# Patient Record
Sex: MALE | Race: OTHER | HISPANIC OR LATINO
[De-identification: names, ages, dates, MRNs, and addresses within clinical notes are randomized per-mention and may not be internally consistent; named-entity substitution may affect disease eponyms.]

---

## 2021-09-23 PROBLEM — Z00.00 ENCOUNTER FOR PREVENTIVE HEALTH EXAMINATION: Status: ACTIVE | Noted: 2021-09-23

## 2021-09-24 ENCOUNTER — NON-APPOINTMENT (OUTPATIENT)
Age: 66
End: 2021-09-24

## 2021-09-24 ENCOUNTER — APPOINTMENT (OUTPATIENT)
Dept: CARDIOLOGY | Facility: CLINIC | Age: 66
End: 2021-09-24
Payer: COMMERCIAL

## 2021-09-24 VITALS
SYSTOLIC BLOOD PRESSURE: 134 MMHG | BODY MASS INDEX: 28.34 KG/M2 | OXYGEN SATURATION: 95 % | DIASTOLIC BLOOD PRESSURE: 76 MMHG | HEART RATE: 86 BPM | WEIGHT: 187 LBS | TEMPERATURE: 98.4 F | HEIGHT: 68 IN

## 2021-09-24 VITALS — DIASTOLIC BLOOD PRESSURE: 68 MMHG | SYSTOLIC BLOOD PRESSURE: 124 MMHG

## 2021-09-24 PROCEDURE — 93000 ELECTROCARDIOGRAM COMPLETE: CPT

## 2021-09-24 PROCEDURE — 99204 OFFICE O/P NEW MOD 45 MIN: CPT

## 2021-09-24 NOTE — HISTORY OF PRESENT ILLNESS
[FreeTextEntry1] : 66M h/o HLD, GERD, prostate cancer s/p radical prostatectomy (2/2021) with lymph node positive on androgen deprivation therapy (Apalutamide/Lupron) and salvage radiation follows with Dr. Mayelin Newton and Erasmo Koehler at [facility], recent onset of midsternal chest pain refer for cardiology evaluation. \par \par He reports 1 week ago at night awoke from sleep several times, pressure like pain, lasted for 10-15 minutes and occurred again 2nd time, feels different from his acid reflux, but had eaten some spicy food prior, then about 2 days later has similar chest discomfort at night but was milder. Denies exertional chest discomfort, can ambulate for 1 mile at time. \par \par \par No CAD or stroke in family\par Never smoker, social alcohol\par Working as , living in [place] \par \par Completed Pfizer vaccine

## 2021-09-24 NOTE — DISCUSSION/SUMMARY
[FreeTextEntry1] : 66M h/o HLD, GERD, prostate cancer s/p radical prostatectomy (2/2021) with lymph node positive on androgen deprivation therapy (Apalutamide/Lupron) and salvage radiation follows with Dr. Mayelin Newton and Erasmo Koehler at [facility], recent onset of midsternal chest pain refer for cardiology evaluation. \par \par Overall atypical nonexertional chest pain occurred during sleep, EKG within normal, we have discussed options of further cardiac workup with exercise treadmill stress vs. cardiac CTA, he prefers nonradioactive method first with exercise treadmill stress which is appropriate with his atypical symptoms, will check baseline echocardiogram as well. We have also discussed literature reports of increased cardiovascular risk with androgen deprivation therapy, long term goal is for for optimal risk factors control of metabolic syndrome with controlling lipid and blood glucose. \par \par \par \par 1. Atypical chest pain- await TTE and EST, consider cardiac CTA if stress test abnormal. \par \par 2. HLD- on low dose atorvastatin, check fasting lipid on next blood drawn with A1c. \par \par 3. GERD- on omeprazole. \par \par 4. Prostate cancer- on ADT and salvage radiation. \par \par \par Follow up as needed if the above tests are normal. \par \par \par CC: Dr. Newton and Dr. Koehler ([facility]).

## 2021-10-15 ENCOUNTER — APPOINTMENT (OUTPATIENT)
Dept: CARDIOLOGY | Facility: CLINIC | Age: 66
End: 2021-10-15
Payer: COMMERCIAL

## 2021-10-15 PROCEDURE — 93306 TTE W/DOPPLER COMPLETE: CPT

## 2021-10-15 PROCEDURE — 93015 CV STRESS TEST SUPVJ I&R: CPT

## 2021-10-15 PROCEDURE — 93356 MYOCRD STRAIN IMG SPCKL TRCK: CPT

## 2021-10-18 ENCOUNTER — NON-APPOINTMENT (OUTPATIENT)
Age: 66
End: 2021-10-18

## 2021-10-20 ENCOUNTER — RESULT REVIEW (OUTPATIENT)
Age: 66
End: 2021-10-20

## 2021-11-05 ENCOUNTER — OUTPATIENT (OUTPATIENT)
Dept: OUTPATIENT SERVICES | Facility: HOSPITAL | Age: 66
LOS: 1 days | End: 2021-11-05
Payer: COMMERCIAL

## 2021-11-05 DIAGNOSIS — R07.9 CHEST PAIN, UNSPECIFIED: ICD-10-CM

## 2021-11-05 PROCEDURE — 75571 CT HRT W/O DYE W/CA TEST: CPT

## 2021-11-05 PROCEDURE — 75571 CT HRT W/O DYE W/CA TEST: CPT | Mod: 26

## 2021-11-07 DIAGNOSIS — R94.39 ABNORMAL RESULT OF OTHER CARDIOVASCULAR FUNCTION STUDY: ICD-10-CM

## 2021-11-07 DIAGNOSIS — I25.84 ATHEROSCLEROTIC HEART DISEASE OF NATIVE CORONARY ARTERY W/OUT ANGINA PECTORIS: ICD-10-CM

## 2021-11-07 DIAGNOSIS — I25.10 ATHEROSCLEROTIC HEART DISEASE OF NATIVE CORONARY ARTERY W/OUT ANGINA PECTORIS: ICD-10-CM

## 2021-11-07 DIAGNOSIS — R07.9 CHEST PAIN, UNSPECIFIED: ICD-10-CM

## 2021-11-15 RX ORDER — METOPROLOL TARTRATE 100 MG/1
100 TABLET, FILM COATED ORAL DAILY
Qty: 2 | Refills: 0 | Status: DISCONTINUED | COMMUNITY
Start: 2021-10-18 | End: 2021-11-15

## 2021-11-18 ENCOUNTER — NON-APPOINTMENT (OUTPATIENT)
Age: 66
End: 2021-11-18

## 2021-11-18 ENCOUNTER — APPOINTMENT (OUTPATIENT)
Dept: CT IMAGING | Facility: CLINIC | Age: 66
End: 2021-11-18

## 2021-11-25 DIAGNOSIS — Z01.818 ENCOUNTER FOR OTHER PREPROCEDURAL EXAMINATION: ICD-10-CM

## 2021-11-26 ENCOUNTER — APPOINTMENT (OUTPATIENT)
Dept: DISASTER EMERGENCY | Facility: CLINIC | Age: 66
End: 2021-11-26

## 2021-11-26 NOTE — H&P PST ADULT - PRIMARY CARE PROVIDER
Santy Barajas ([address], [place] [address], Phone: [phone], Fax: [phone])

## 2021-11-26 NOTE — H&P PST ADULT - NSICDXPASTSURGICALHX_GEN_ALL_CORE_FT
PAST SURGICAL HISTORY:  History of appendectomy     History of knee surgery     History of left inguinal hernia repair     History of radical prostatectomy

## 2021-11-26 NOTE — H&P PST ADULT - NSICDXFAMILYHX_GEN_ALL_CORE_FT
FAMILY HISTORY:  Father  Still living? Yes, Estimated age: 81-90  Family history of hypertension, Age at diagnosis: Age Unknown  Family history of malignant neoplasm of prostate, Age at diagnosis: Age Unknown    Mother  Still living? Yes, Estimated age: 81-90  Family history of dementia, Age at diagnosis: Age Unknown

## 2021-11-26 NOTE — H&P PST ADULT - LAB RESULTS AND INTERPRETATION
Lipids       Total Cholesterol: 153       Triglycerides: 111       HDL: 58       Non-HDL: 95       LDL: 73    HgbA1C: 4.7%

## 2021-11-26 NOTE — H&P PST ADULT - NSICDXPASTMEDICALHX_GEN_ALL_CORE_FT
PAST MEDICAL HISTORY:  GERD (gastroesophageal reflux disease)     Primary hypertension     Prostate cancer     Pure hypercholesterolemia

## 2021-11-26 NOTE — H&P PST ADULT - OTHER CARE PROVIDERS
Jack Younger ([facility] [place], [address], [address], [place] [address], Phone: [phone], Fax: [phone]) Jack Younger ([facility] [place], [address], [address], [place] [address], Phone: [phone], Fax: [phone]), Erasmo Koehler ([facility], [address] [place] [address], Phone: [phone])

## 2021-11-26 NOTE — H&P PST ADULT - ASSESSMENT
Assessment:     ASA:   Mall:   ABR:   COVID:     Problem List:   1.   ·	LHC and possible intervention. Consent obtained.  ·	Will start DAPT if PCI performed.  ·	IV: NS KVO  ·	Aspirin if not taken today.    2.     Discharge Planning:   ·	Discharge today if no PCI performed.  ·	Discharge in AM if PCI performed. Assessment: 67y/o male never smoker with history of HTN, HLD, GERD, and lymph node positive prostate cancer on apalutamide and Lupron, S/P radical prostatectomy, c/o a few of episodes of awakening with midsternal chest pressure. He had an exercise stress test during which he exercised for 9:00 and had equivocal EKG changes. He had a cardiac calcium score which showed calcium scores of LM: 216, LAD: 857, LCX: 173, RCA: 122, Total: 1368. He admits to mild MAZARIEGOS (CCS class 2 anginal equivalent), but denies palpitations, orthopnea, PND or syncope/near syncope. He denies any recent hematuria, black or bloody stools, or bleeding/bruising issues.    ASA: 3  Mall: 2  ABR:   COVID: Negative 11/26/2021    Problem List:   1. Unstable angina with high risk cardiac calcium score. The patient is on apalutamide (known adverse reaction of cardiac ischemia)  ·	LHC and possible intervention. Consent obtained.  ·	Will start DAPT if PCI performed.  ·	IV: NS KVO  ·	Aspirin if not taken today.    2.     Discharge Planning:   ·	Discharge today if no PCI performed.  ·	Discharge in AM if PCI performed. Assessment: 67y/o male never smoker with history of HTN, HLD, GERD, and lymph node positive prostate cancer on apalutamide and Lupron, S/P radical prostatectomy, c/o a few of episodes of awakening with midsternal chest pressure. He had an exercise stress test during which he exercised for 9:00 and had equivocal EKG changes. He had a cardiac calcium score which showed calcium scores of LM: 216, LAD: 857, LCX: 173, RCA: 122, Total: 1368. He admits to mild MAZARIEGOS (CCS class 2 anginal equivalent), but denies palpitations, orthopnea, PND or syncope/near syncope. He denies any recent hematuria, black or bloody stools, or bleeding/bruising issues.    ASA: 3  Mall: 2  ABR: 1.0%  COVID: Negative 11/26/2021    Problem List:   1. Unstable angina with high risk cardiac calcium score. The patient is on apalutamide (known adverse reaction of cardiac ischemia)  ·	LHC and possible intervention. Consent obtained.  ·	Will start DAPT if PCI performed.  ·	IV: NS KVO  ·	Aspirin if not taken today.    Discharge Planning:   ·	Discharge today if no PCI performed.  ·	Discharge in AM if PCI performed.

## 2021-11-26 NOTE — H&P PST ADULT - HISTORY OF PRESENT ILLNESS
65y/o male never smoker with history of HTN, GERD, and lymph node positive prostate cancer on apalutamide and Lupron, c/o a few of episodes of awakening with midsternal chest pressure. He had an exercise stress test during which he exercised for 9:00 and had equivocal EKG changes. He had a cardiac calcium score which showed calcium scores of LM: 216, LAD: 857, LCX: 173, RCA: 122, Total: 1368.     Symptoms:        Angina (Class): 4       Ischemic Symptoms:     Heart Failure: N/A    Assessment of LVEF:       EF: 60-65%       Assessed by: Echo       Date: 10/15/2021    Prior Cardiac Interventions:       PCI's: N/A       CABG: N/A    Noninvasive Testing:   Stress Test: 10/15/2021       Protocol: Brett       Duration of Exercise: 9:00       Symptoms: Fatigue       EKG Changes: 1 mm upsloping ST depressions in V4-5.       DTS: 9    Cardiac CT Calcium Score: 5/20/2021       LM: 216       LAD: 857       LCX: 173       RCA: 122       Total: 1368    Echo: 10/15/2021       LV: Normal, EF 60-65%       RV: Normal       LA: Normal       RA: Normal       Mitral Valve: Trace MR       Aortic Valve: Normal       Tricuspid Valve: Trivial TR       Pulmonic Valve: Normal       Pericardium: No evidence of pericardial effusion    Antianginal Therapies:        Beta Blockers: N/A       Calcium Channel Blockers: N/A       Long Acting Nitrates: N/A       Ranexa: N/A    Associated Risk Factors:        Cerebrovascular Disease: N/A       Chronic Lung Disease: N/A       Peripheral Arterial Disease: N/A       Chronic Kidney Disease (if yes, what is GFR): N/A       Uncontrolled Diabetes (if yes, what is HgbA1C or FBS): N/A       Poorly Controlled Hypertension (if yes, what is SBP): N/A       Morbid Obesity (if yes, what is BMI): N/A       History of Recent Ventricular Arrhythmia: N/A       Inability to Ambulate Safely: N/A       Need for Therapeutic Anticoagulation: N/A       Antiplatelet or Contrast Allergy: N/A    Social History:        Marital:        Tobacco:        ETOH:        Caffeine:     ROS:   General: No fevers/chills, no fatigue  HEENT: No visual disturbances, no hearing loss, no headaches, no epistaxis.  CV: No chest pain, no MAZARIEGOS, no palpitations, no edema, no orthopnea, no PND.  Respiratory: No dyspnea, no wheeze, no cough.  GI: no nausea/vomiting, no black/bloody stools.  : No hematuria  Musculoskeletal: No myalgias, no arthralgias, no back pain.  Neurologic: No weakness, no hemiparesis, no paresthesias, no seizures, no syncope/near syncope.    Vital Signs Last 24 Hrs  T(C): --  T(F): --  HR: --  BP: --  BP(mean): --  RR: --  SpO2: --    Physical Examination:   General: Awake, alert, speech clear, no acute distress.  HEENT: PERRL, EOMI.  Neck: No elevated JVP, no bruit, trachea midline.  Chest: CTA B/L, S1, S2, no murmur, RRR.  Abdomen: Soft, nontender, nondistended, normal bowel sounds.  Extremities: No edema, 2+ pulses X 4 extremities.  Neurologic: A&OX3, CN 2-12 grossly intact. 65y/o male never smoker with history of HTN, HLD, GERD, and lymph node positive prostate cancer on apalutamide and Lupron, S/P radical prostatectomy, c/o a few of episodes of awakening with midsternal chest pressure. He had an exercise stress test during which he exercised for 9:00 and had equivocal EKG changes. He had a cardiac calcium score which showed calcium scores of LM: 216, LAD: 857, LCX: 173, RCA: 122, Total: 1368. He admits to mild MAZARIEGOS (CCS class 2 anginal equivalent), but denies palpitations, orthopnea, PND or syncope/near syncope. He denies any recent hematuria, black or bloody stools, or bleeding/bruising issues.    Symptoms:        Angina (Class): 4       Ischemic Symptoms: MAZARIEGOS (CCS class 2 anginal equivalent)    Heart Failure: N/A    Assessment of LVEF:       EF: 60-65%       Assessed by: Echo       Date: 10/15/2021    Prior Cardiac Interventions:       PCI's: N/A       CABG: N/A    Noninvasive Testing:   Stress Test: 10/15/2021       Protocol: Brett       Duration of Exercise: 9:00       Symptoms: Fatigue       EKG Changes: 1 mm upsloping ST depressions in V4-5.       DTS: 9    Cardiac CT Calcium Score: 5/20/2021       LM: 216       LAD: 857       LCX: 173       RCA: 122       Total: 1368    Echo: 10/15/2021       LV: Normal, EF 60-65%       RV: Normal       LA: Normal       RA: Normal       Mitral Valve: Trace MR       Aortic Valve: Normal       Tricuspid Valve: Trivial TR       Pulmonic Valve: Normal       Pericardium: No evidence of pericardial effusion    Antianginal Therapies:        Beta Blockers: N/A       Calcium Channel Blockers: N/A       Long Acting Nitrates: N/A       Ranexa: N/A    Associated Risk Factors:        Cerebrovascular Disease: N/A       Chronic Lung Disease: N/A       Peripheral Arterial Disease: N/A       Chronic Kidney Disease (if yes, what is GFR): N/A       Uncontrolled Diabetes (if yes, what is HgbA1C or FBS): N/A       Poorly Controlled Hypertension (if yes, what is SBP): N/A       Morbid Obesity (if yes, what is BMI): N/A       History of Recent Ventricular Arrhythmia: N/A       Inability to Ambulate Safely: N/A       Need for Therapeutic Anticoagulation: N/A       Antiplatelet or Contrast Allergy: N/A    Social History:        Marital: , lives alone, permanent residence is [place] but staying in [place] for radiation therapy.       Tobacco: Never smoker       ETOH: Social beer drinker       Caffeine: 1-2 cups of coffee/day    ROS:   General: No fevers/chills, no fatigue, + hot flashes.  HEENT: No visual disturbances, no hearing loss, no headaches, no epistaxis.  CV: + nonexertional chest pain, + MAZARIEGOS, no palpitations, no edema, no orthopnea, no PND.  Respiratory: No dyspnea, no wheeze, no cough.  GI: no nausea/vomiting, no black/bloody stools.  : No hematuria  Musculoskeletal: No myalgias, right shoulder pain, no back pain.  Neurologic: No weakness, no hemiparesis, no paresthesias, no seizures, no syncope/near syncope.    T(C): --  HR: --  BP: --  RR: --  SpO2: --    Physical Examination:   General: Awake, alert, speech clear, no acute distress.  HEENT: PERRL, EOMI.  Neck: No elevated JVP, no bruit, trachea midline.  Chest: CTA B/L, S1, S2, no murmur, RRR.  Abdomen: Soft, nontender, nondistended, normal bowel sounds.  Extremities: No edema, 2+ pulses X 4 extremities.  Neurologic: A&OX3, CN 2-12 grossly intact. 67y/o male never smoker with history of HTN, HLD, GERD, and lymph node positive prostate cancer on apalutamide and Lupron, S/P radical prostatectomy, c/o a few of episodes of awakening with midsternal chest pressure. He had an exercise stress test during which he exercised for 9:00 and had equivocal EKG changes. He had a cardiac calcium score which showed calcium scores of LM: 216, LAD: 857, LCX: 173, RCA: 122, Total: 1368. He admits to mild MAZARIEGOS (CCS class 2 anginal equivalent), but denies palpitations, orthopnea, PND or syncope/near syncope. He denies any recent hematuria, black or bloody stools, or bleeding/bruising issues.    Symptoms:        Angina (Class): 4       Ischemic Symptoms: MAZARIEGOS (CCS class 2 anginal equivalent)    Heart Failure: N/A    Assessment of LVEF:       EF: 60-65%       Assessed by: Echo       Date: 10/15/2021    Prior Cardiac Interventions:       PCI's: N/A       CABG: N/A    Noninvasive Testing:   Stress Test: 10/15/2021       Protocol: Brett       Duration of Exercise: 9:00       Symptoms: Fatigue       EKG Changes: 1 mm upsloping ST depressions in V4-5.       DTS: 9    Cardiac CT Calcium Score: 5/20/2021       LM: 216       LAD: 857       LCX: 173       RCA: 122       Total: 1368    Echo: 10/15/2021       LV: Normal, EF 60-65%       RV: Normal       LA: Normal       RA: Normal       Mitral Valve: Trace MR       Aortic Valve: Normal       Tricuspid Valve: Trivial TR       Pulmonic Valve: Normal       Pericardium: No evidence of pericardial effusion    Antianginal Therapies:        Beta Blockers: N/A       Calcium Channel Blockers: N/A       Long Acting Nitrates: N/A       Ranexa: N/A    Associated Risk Factors:        Cerebrovascular Disease: N/A       Chronic Lung Disease: N/A       Peripheral Arterial Disease: N/A       Chronic Kidney Disease (if yes, what is GFR): N/A       Uncontrolled Diabetes (if yes, what is HgbA1C or FBS): N/A       Poorly Controlled Hypertension (if yes, what is SBP): N/A       Morbid Obesity (if yes, what is BMI): N/A       History of Recent Ventricular Arrhythmia: N/A       Inability to Ambulate Safely: N/A       Need for Therapeutic Anticoagulation: N/A       Antiplatelet or Contrast Allergy: N/A    Social History:        Marital: , lives alone, permanent residence is [place] but staying in [place] for radiation therapy.       Tobacco: Never smoker       ETOH: Social beer drinker       Caffeine: 1-2 cups of coffee/day    ROS:   General: No fevers/chills, no fatigue, + hot flashes.  HEENT: No visual disturbances, no hearing loss, no headaches, no epistaxis.  CV: + nonexertional chest pain, + MAZARIEGOS, no palpitations, no edema, no orthopnea, no PND.  Respiratory: No dyspnea, no wheeze, no cough.  GI: no nausea/vomiting, no black/bloody stools.  : No hematuria  Musculoskeletal: No myalgias, right shoulder pain, no back pain.  Neurologic: No weakness, no hemiparesis, no paresthesias, no seizures, no syncope/near syncope.    T(C): 36.7 (11-29-21 @ 08:21), Max: 36.7 (11-29-21 @ 08:21)  HR: 80 (11-29-21 @ 08:21)  BP: 145/65 (11-29-21 @ 08:21)  RR: 20 (11-29-21 @ 08:21)  SpO2: 98% (11-29-21 @ 08:21)    Physical Examination:   General: Awake, alert, speech clear, no acute distress.  HEENT: PERRL, EOMI.  Neck: No elevated JVP, no bruit, trachea midline.  Chest: CTA B/L, S1, S2, no murmur, RRR.  Abdomen: Soft, nontender, nondistended, normal bowel sounds.  Extremities: No edema, 2+ pulses X 4 extremities.  Neurologic: A&OX3, CN 2-12 grossly intact.

## 2021-11-27 LAB — SARS-COV-2 N GENE NPH QL NAA+PROBE: NOT DETECTED

## 2021-11-29 ENCOUNTER — INPATIENT (INPATIENT)
Facility: HOSPITAL | Age: 66
LOS: 1 days | Discharge: HOME | DRG: 247 | End: 2021-12-01
Attending: INTERNAL MEDICINE | Admitting: INTERNAL MEDICINE
Payer: COMMERCIAL

## 2021-11-29 VITALS
TEMPERATURE: 98 F | SYSTOLIC BLOOD PRESSURE: 145 MMHG | HEART RATE: 80 BPM | OXYGEN SATURATION: 98 % | RESPIRATION RATE: 20 BRPM | DIASTOLIC BLOOD PRESSURE: 65 MMHG | WEIGHT: 180.34 LBS | HEIGHT: 68 IN

## 2021-11-29 DIAGNOSIS — Z98.890 OTHER SPECIFIED POSTPROCEDURAL STATES: Chronic | ICD-10-CM

## 2021-11-29 DIAGNOSIS — Z90.49 ACQUIRED ABSENCE OF OTHER SPECIFIED PARTS OF DIGESTIVE TRACT: Chronic | ICD-10-CM

## 2021-11-29 DIAGNOSIS — Z90.79 ACQUIRED ABSENCE OF OTHER GENITAL ORGAN(S): Chronic | ICD-10-CM

## 2021-11-29 DIAGNOSIS — R07.9 CHEST PAIN, UNSPECIFIED: ICD-10-CM

## 2021-11-29 LAB
A1C WITH ESTIMATED AVERAGE GLUCOSE RESULT: 4.7 % — SIGNIFICANT CHANGE UP (ref 4–5.6)
ABO RH CONFIRMATION: SIGNIFICANT CHANGE UP
ALBUMIN SERPL ELPH-MCNC: 4.4 G/DL — SIGNIFICANT CHANGE UP (ref 3.3–5.2)
ALP SERPL-CCNC: 95 U/L — SIGNIFICANT CHANGE UP (ref 40–120)
ALT FLD-CCNC: 24 U/L — SIGNIFICANT CHANGE UP
ANION GAP SERPL CALC-SCNC: 13 MMOL/L — SIGNIFICANT CHANGE UP (ref 5–17)
AST SERPL-CCNC: 26 U/L — SIGNIFICANT CHANGE UP
BASOPHILS # BLD AUTO: 0.03 K/UL — SIGNIFICANT CHANGE UP (ref 0–0.2)
BASOPHILS NFR BLD AUTO: 0.8 % — SIGNIFICANT CHANGE UP (ref 0–2)
BILIRUB SERPL-MCNC: 0.3 MG/DL — LOW (ref 0.4–2)
BLD GP AB SCN SERPL QL: SIGNIFICANT CHANGE UP
BUN SERPL-MCNC: 25.5 MG/DL — HIGH (ref 8–20)
CALCIUM SERPL-MCNC: 9 MG/DL — SIGNIFICANT CHANGE UP (ref 8.6–10.2)
CHLORIDE SERPL-SCNC: 102 MMOL/L — SIGNIFICANT CHANGE UP (ref 98–107)
CHOLEST SERPL-MCNC: 153 MG/DL — SIGNIFICANT CHANGE UP
CO2 SERPL-SCNC: 22 MMOL/L — SIGNIFICANT CHANGE UP (ref 22–29)
CREAT SERPL-MCNC: 0.99 MG/DL — SIGNIFICANT CHANGE UP (ref 0.5–1.3)
EOSINOPHIL # BLD AUTO: 0.15 K/UL — SIGNIFICANT CHANGE UP (ref 0–0.5)
EOSINOPHIL NFR BLD AUTO: 3.9 % — SIGNIFICANT CHANGE UP (ref 0–6)
ESTIMATED AVERAGE GLUCOSE: 88 MG/DL — SIGNIFICANT CHANGE UP (ref 68–114)
GLUCOSE SERPL-MCNC: 97 MG/DL — SIGNIFICANT CHANGE UP (ref 70–99)
HCT VFR BLD CALC: 36.7 % — LOW (ref 39–50)
HDLC SERPL-MCNC: 58 MG/DL — SIGNIFICANT CHANGE UP
HGB BLD-MCNC: 12.5 G/DL — LOW (ref 13–17)
IMM GRANULOCYTES NFR BLD AUTO: 0.8 % — SIGNIFICANT CHANGE UP (ref 0–1.5)
LIPID PNL WITH DIRECT LDL SERPL: 73 MG/DL — SIGNIFICANT CHANGE UP
LYMPHOCYTES # BLD AUTO: 0.55 K/UL — LOW (ref 1–3.3)
LYMPHOCYTES # BLD AUTO: 14.2 % — SIGNIFICANT CHANGE UP (ref 13–44)
MAGNESIUM SERPL-MCNC: 2 MG/DL — SIGNIFICANT CHANGE UP (ref 1.6–2.6)
MCHC RBC-ENTMCNC: 32.2 PG — SIGNIFICANT CHANGE UP (ref 27–34)
MCHC RBC-ENTMCNC: 34.1 GM/DL — SIGNIFICANT CHANGE UP (ref 32–36)
MCV RBC AUTO: 94.6 FL — SIGNIFICANT CHANGE UP (ref 80–100)
MONOCYTES # BLD AUTO: 0.47 K/UL — SIGNIFICANT CHANGE UP (ref 0–0.9)
MONOCYTES NFR BLD AUTO: 12.1 % — SIGNIFICANT CHANGE UP (ref 2–14)
NEUTROPHILS # BLD AUTO: 2.64 K/UL — SIGNIFICANT CHANGE UP (ref 1.8–7.4)
NEUTROPHILS NFR BLD AUTO: 68.2 % — SIGNIFICANT CHANGE UP (ref 43–77)
NON HDL CHOLESTEROL: 95 MG/DL — SIGNIFICANT CHANGE UP
PLATELET # BLD AUTO: 222 K/UL — SIGNIFICANT CHANGE UP (ref 150–400)
POTASSIUM SERPL-MCNC: 3.8 MMOL/L — SIGNIFICANT CHANGE UP (ref 3.5–5.3)
POTASSIUM SERPL-SCNC: 3.8 MMOL/L — SIGNIFICANT CHANGE UP (ref 3.5–5.3)
PROT SERPL-MCNC: 7 G/DL — SIGNIFICANT CHANGE UP (ref 6.6–8.7)
RBC # BLD: 3.88 M/UL — LOW (ref 4.2–5.8)
RBC # FLD: 13.7 % — SIGNIFICANT CHANGE UP (ref 10.3–14.5)
SODIUM SERPL-SCNC: 137 MMOL/L — SIGNIFICANT CHANGE UP (ref 135–145)
TRIGL SERPL-MCNC: 111 MG/DL — SIGNIFICANT CHANGE UP
WBC # BLD: 3.87 K/UL — SIGNIFICANT CHANGE UP (ref 3.8–10.5)
WBC # FLD AUTO: 3.87 K/UL — SIGNIFICANT CHANGE UP (ref 3.8–10.5)

## 2021-11-29 PROCEDURE — 99223 1ST HOSP IP/OBS HIGH 75: CPT | Mod: 25

## 2021-11-29 PROCEDURE — 93010 ELECTROCARDIOGRAM REPORT: CPT | Mod: 76

## 2021-11-29 RX ORDER — METOPROLOL TARTRATE 50 MG
12.5 TABLET ORAL
Refills: 0 | Status: DISCONTINUED | OUTPATIENT
Start: 2021-11-29 | End: 2021-12-01

## 2021-11-29 RX ORDER — POTASSIUM CHLORIDE 20 MEQ
20 PACKET (EA) ORAL ONCE
Refills: 0 | Status: COMPLETED | OUTPATIENT
Start: 2021-11-29 | End: 2021-11-29

## 2021-11-29 RX ORDER — PANTOPRAZOLE SODIUM 20 MG/1
40 TABLET, DELAYED RELEASE ORAL
Refills: 0 | Status: DISCONTINUED | OUTPATIENT
Start: 2021-11-29 | End: 2021-12-01

## 2021-11-29 RX ORDER — VALACYCLOVIR 500 MG/1
1 TABLET, FILM COATED ORAL
Qty: 0 | Refills: 0 | DISCHARGE

## 2021-11-29 RX ORDER — CLOPIDOGREL BISULFATE 75 MG/1
75 TABLET, FILM COATED ORAL DAILY
Refills: 0 | Status: DISCONTINUED | OUTPATIENT
Start: 2021-11-30 | End: 2021-12-01

## 2021-11-29 RX ORDER — TADALAFIL 10 MG/1
1 TABLET, FILM COATED ORAL
Qty: 0 | Refills: 0 | DISCHARGE

## 2021-11-29 RX ORDER — APALUTAMIDE 240 MG/1
4 TABLET, FILM COATED ORAL
Qty: 0 | Refills: 0 | DISCHARGE

## 2021-11-29 RX ORDER — ASPIRIN/CALCIUM CARB/MAGNESIUM 324 MG
0 TABLET ORAL
Qty: 0 | Refills: 0 | DISCHARGE

## 2021-11-29 RX ORDER — CLOPIDOGREL BISULFATE 75 MG/1
600 TABLET, FILM COATED ORAL ONCE
Refills: 0 | Status: COMPLETED | OUTPATIENT
Start: 2021-11-29 | End: 2021-11-29

## 2021-11-29 RX ORDER — ASPIRIN/CALCIUM CARB/MAGNESIUM 324 MG
81 TABLET ORAL DAILY
Refills: 0 | Status: DISCONTINUED | OUTPATIENT
Start: 2021-11-29 | End: 2021-12-01

## 2021-11-29 RX ORDER — PANTOPRAZOLE SODIUM 20 MG/1
1 TABLET, DELAYED RELEASE ORAL
Qty: 0 | Refills: 0 | DISCHARGE

## 2021-11-29 RX ORDER — APALUTAMIDE 240 MG/1
240 TABLET, FILM COATED ORAL
Refills: 0 | Status: DISCONTINUED | OUTPATIENT
Start: 2021-11-29 | End: 2021-12-01

## 2021-11-29 RX ORDER — ATORVASTATIN CALCIUM 80 MG/1
80 TABLET, FILM COATED ORAL AT BEDTIME
Refills: 0 | Status: DISCONTINUED | OUTPATIENT
Start: 2021-11-29 | End: 2021-12-01

## 2021-11-29 RX ADMIN — CLOPIDOGREL BISULFATE 600 MILLIGRAM(S): 75 TABLET, FILM COATED ORAL at 18:57

## 2021-11-29 RX ADMIN — Medication 12.5 MILLIGRAM(S): at 17:01

## 2021-11-29 RX ADMIN — ATORVASTATIN CALCIUM 80 MILLIGRAM(S): 80 TABLET, FILM COATED ORAL at 21:39

## 2021-11-29 RX ADMIN — Medication 20 MILLIEQUIVALENT(S): at 09:13

## 2021-11-29 NOTE — PROGRESS NOTE ADULT - SUBJECTIVE AND OBJECTIVE BOX
Department of Cardiology                                                                  [facility]/[place] [address] [place] [address]                                                            Telephone: [phone]. Fax:[phone]                                                                                         PCI NOTE       Subjective:  66y  Male who had a left heart catheterization which showed (official report pending):       LM: 20% stenosis       LAD: 70% proximal and mid stenosis, heavily calcified.       LCX: 50% mid stenosis       RCA: calcified 80% mid stenosis treated with a 4.0 X 38 Synergy JANETH post dilated to 4.5 mm.         Access: Right radial artery       Hemostasis: Radial band       Total Contrast: 147 mL Omnipaque    PAST MEDICAL & SURGICAL HISTORY:  GERD (gastroesophageal reflux disease)  Primary hypertension  Prostate cancer  Pure hypercholesterolemia  History of radical prostatectomy  History of left inguinal hernia repair  History of appendectomy  History of knee surgery    FAMILY HISTORY:  Family history of hypertension (Father)  Family history of malignant neoplasm of prostate (Father)  Family history of dementia (Mother)    Home Medications:  apalutamide 60 mg oral tablet: 4 tab(s) orally once a day (29 Nov 2021 08:37)  aspirin 81 mg oral tablet:  (29 Nov 2021 08:37)  atorvastatin 40 mg oral tablet: 1 tab(s) orally once a day (29 Nov 2021 08:37)  pantoprazole 40 mg oral delayed release tablet: 1 tab(s) orally once a day (29 Nov 2021 08:37)  tadalafil 5 mg oral tablet: 1 tab(s) orally once a day, once a week take 4 tab (29 Nov 2021 08:37)  valACYclovir 1 g oral tablet: 1 tab(s) orally 2 times a day for 7days as needed for sores (29 Nov 2021 08:37)    HPI: 67y/o male never smoker with history of HTN, HLD, GERD, and lymph node positive prostate cancer on apalutamide and Lupron, S/P radical prostatectomy, c/o a few of episodes of awakening with midsternal chest pressure. He had an exercise stress test during which he exercised for 9:00 and had equivocal EKG changes. He had a cardiac calcium score which showed calcium scores of LM: 216, LAD: 857, LCX: 173, RCA: 122, Total: 1368. He admits to mild MAZARIEGOS (CCS class 2 anginal equivalent), but denies palpitations, orthopnea, PND or syncope/near syncope. He denies any recent hematuria, black or bloody stools, or bleeding/bruising issues.    General: No fatigue, no fevers/chills  Respiratory: No dyspnea, no cough, no wheeze  CV: No chest pain, no palpitations  Abd: No nausea  Neuro: No headache, no dizziness    No Known Allergies    Objective:  Vital Signs Last 24 Hrs  T(C): 36.7 (29 Nov 2021 08:21), Max: 36.7 (29 Nov 2021 08:21)  T(F): 98 (29 Nov 2021 08:21), Max: 98 (29 Nov 2021 08:21)  HR: 72 (29 Nov 2021 10:45) (72 - 80)  BP: 166/93 (29 Nov 2021 10:45) (145/65 - 166/93)  RR: 16 (29 Nov 2021 10:45) (16 - 20)  SpO2: 99% (29 Nov 2021 10:45) (98% - 99%)    CM: SR  Neuro: A&OX3, CN 2-12 intact  HEENT: NC, AT  Lungs: CTA B/L  CV: S1, S2, no murmur, RRR  Abd: Soft  Extremity: Right radial band: no bleeding, fingers warm with good cap refil    EKG: NSR, normal axis, no significant ST/T wave abnormality                        12.5   3.87  )-----------( 222      ( 29 Nov 2021 08:18 )             36.7     11-29    137  |  102  |  25.5  ----------------------------<  97  3.8   |  22.0  |  0.99    Ca    9.0      29 Nov 2021 08:18  Mg     2.0     11-29    TPro  7.0  /  Alb  4.4  /  TBili  0.3  /  DBili  x   /  AST  26  /  ALT  24  /  AlkPhos  95  11-29    Lipids       Total Cholesterol: 153       Triglycerides: 111       HDL: 58       Non-HDL: 95       LDL: 73    HgbA1C: 4.7%                                                                          Department of Cardiology                                                                  [facility]/[place] [address]                                                            Telephone: [phone]. Fax:[phone]                                                                                         PCI NOTE       Subjective:  66y  Male who had a left heart catheterization which showed:  VENTRICLES: No LV gram was performed; however, a recent echocardiogram demonstrated an EF of 55 %.  CORONARY VESSELS: The coronary circulation is right dominant.  LM:     --  LM: Normal.  LAD:     --  Proximal LAD: There was a 70 % stenosis. The lesion was heavily calcified.  --  Mid LAD: There was a diffuse 80 % stenosis. The lesion was heavily calcified.  CX:     --  Mid circumflex: There was a 50 % stenosis.  RCA:     --  Proximal RCA: There was a 40 % stenosis.  --  Mid RCA: There was a 80 % stenosis. It was treated with a 4.0 X 38 Synergy JANETH post dilated to 4.5 mm.         Access: Right radial artery       Hemostasis: Radial band       Total Contrast: 147 mL Omnipaque    PAST MEDICAL & SURGICAL HISTORY:  GERD (gastroesophageal reflux disease)  Primary hypertension  Prostate cancer  Pure hypercholesterolemia  History of radical prostatectomy  History of left inguinal hernia repair  History of appendectomy  History of knee surgery    FAMILY HISTORY:  Family history of hypertension (Father)  Family history of malignant neoplasm of prostate (Father)  Family history of dementia (Mother)    Home Medications:  apalutamide 60 mg oral tablet: 4 tab(s) orally once a day (29 Nov 2021 08:37)  aspirin 81 mg oral tablet:  (29 Nov 2021 08:37)  atorvastatin 40 mg oral tablet: 1 tab(s) orally once a day (29 Nov 2021 08:37)  pantoprazole 40 mg oral delayed release tablet: 1 tab(s) orally once a day (29 Nov 2021 08:37)  tadalafil 5 mg oral tablet: 1 tab(s) orally once a day, once a week take 4 tab (29 Nov 2021 08:37)  valACYclovir 1 g oral tablet: 1 tab(s) orally 2 times a day for 7days as needed for sores (29 Nov 2021 08:37)    HPI: 67y/o male never smoker with history of HTN, HLD, GERD, and lymph node positive prostate cancer on apalutamide and Lupron, S/P radical prostatectomy, c/o a few of episodes of awakening with midsternal chest pressure. He had an exercise stress test during which he exercised for 9:00 and had equivocal EKG changes. He had a cardiac calcium score which showed calcium scores of LM: 216, LAD: 857, LCX: 173, RCA: 122, Total: 1368. He admits to mild MAZARIEGOS (CCS class 2 anginal equivalent), but denies palpitations, orthopnea, PND or syncope/near syncope. He denies any recent hematuria, black or bloody stools, or bleeding/bruising issues.    General: No fatigue, no fevers/chills  Respiratory: No dyspnea, no cough, no wheeze  CV: No chest pain, no palpitations  Abd: No nausea  Neuro: No headache, no dizziness    No Known Allergies    Objective:  Vital Signs Last 24 Hrs  T(C): 36.7 (29 Nov 2021 08:21), Max: 36.7 (29 Nov 2021 08:21)  T(F): 98 (29 Nov 2021 08:21), Max: 98 (29 Nov 2021 08:21)  HR: 72 (29 Nov 2021 10:45) (72 - 80)  BP: 166/93 (29 Nov 2021 10:45) (145/65 - 166/93)  RR: 16 (29 Nov 2021 10:45) (16 - 20)  SpO2: 99% (29 Nov 2021 10:45) (98% - 99%)    CM: SR  Neuro: A&OX3, CN 2-12 intact  HEENT: NC, AT  Lungs: CTA B/L  CV: S1, S2, no murmur, RRR  Abd: Soft  Extremity: Right radial band: no bleeding, fingers warm with good cap refil    EKG: NSR, normal axis, no significant ST/T wave abnormality                        12.5   3.87  )-----------( 222      ( 29 Nov 2021 08:18 )             36.7     11-29    137  |  102  |  25.5  ----------------------------<  97  3.8   |  22.0  |  0.99    Ca    9.0      29 Nov 2021 08:18  Mg     2.0     11-29    TPro  7.0  /  Alb  4.4  /  TBili  0.3  /  DBili  x   /  AST  26  /  ALT  24  /  AlkPhos  95  11-29    Lipids       Total Cholesterol: 153       Triglycerides: 111       HDL: 58       Non-HDL: 95       LDL: 73    HgbA1C: 4.7%

## 2021-11-29 NOTE — PROGRESS NOTE ADULT - ASSESSMENT
66y Male    Assessment and Plan:     1. CAD, S/P PCI of the RCA  ·	Remove radial band at: 12:30PM.  ·	Will return to [facility] for PCI of the LAD with rotational atherectomy on Wednesday.  ·	DAPT: The patient was loaded with Brilinta 180 mg in the CCL. He will be loaded with Plavix 600 mg tonight at 6:00PM, then start Plavix 75mg daily and aspirin 81mg daily tomorrow.  ·	Statin: Lipitor 80 mg daily.  ·	Aggressive lifestyle modification and risk factor reduction.  ·	Cardiac rehab info provided/referral and communication to cardiac rehab completed   ·	CBC, BMP, Mg, EKG in AM    2. HLD  ·	LDL Goal: NonHDL < 100, LDL < 70  ·	LDL slightly above goal.  ·	Statin: Lipitor increased to 80 mg daily.    3. HTN  ·	Will start metoprolol 12.5 mg BID.    4. Prostate cancer  ·	Apalutamide 240 mg daily  ·	Lupron to be continued as an outpatient.    Discharge Planning:   ·	Will return to [facility] for PCI of the LAD on Wednesday.  ·	Discharge on Thursday.  ·	Follow up as an outpatient with: Aren

## 2021-11-30 ENCOUNTER — TRANSCRIPTION ENCOUNTER (OUTPATIENT)
Age: 66
End: 2021-11-30

## 2021-11-30 LAB
ANION GAP SERPL CALC-SCNC: 12 MMOL/L — SIGNIFICANT CHANGE UP (ref 5–17)
BUN SERPL-MCNC: 16 MG/DL — SIGNIFICANT CHANGE UP (ref 8–20)
CALCIUM SERPL-MCNC: 9 MG/DL — SIGNIFICANT CHANGE UP (ref 8.6–10.2)
CHLORIDE SERPL-SCNC: 105 MMOL/L — SIGNIFICANT CHANGE UP (ref 98–107)
CO2 SERPL-SCNC: 23 MMOL/L — SIGNIFICANT CHANGE UP (ref 22–29)
CREAT SERPL-MCNC: 0.94 MG/DL — SIGNIFICANT CHANGE UP (ref 0.5–1.3)
GLUCOSE SERPL-MCNC: 106 MG/DL — HIGH (ref 70–99)
HCT VFR BLD CALC: 35.6 % — LOW (ref 39–50)
HGB BLD-MCNC: 12.1 G/DL — LOW (ref 13–17)
MAGNESIUM SERPL-MCNC: 2.2 MG/DL — SIGNIFICANT CHANGE UP (ref 1.8–2.6)
MCHC RBC-ENTMCNC: 32 PG — SIGNIFICANT CHANGE UP (ref 27–34)
MCHC RBC-ENTMCNC: 34 GM/DL — SIGNIFICANT CHANGE UP (ref 32–36)
MCV RBC AUTO: 94.2 FL — SIGNIFICANT CHANGE UP (ref 80–100)
PLATELET # BLD AUTO: 207 K/UL — SIGNIFICANT CHANGE UP (ref 150–400)
POTASSIUM SERPL-MCNC: 4.1 MMOL/L — SIGNIFICANT CHANGE UP (ref 3.5–5.3)
POTASSIUM SERPL-SCNC: 4.1 MMOL/L — SIGNIFICANT CHANGE UP (ref 3.5–5.3)
RBC # BLD: 3.78 M/UL — LOW (ref 4.2–5.8)
RBC # FLD: 13.5 % — SIGNIFICANT CHANGE UP (ref 10.3–14.5)
SODIUM SERPL-SCNC: 140 MMOL/L — SIGNIFICANT CHANGE UP (ref 135–145)
WBC # BLD: 4.17 K/UL — SIGNIFICANT CHANGE UP (ref 3.8–10.5)
WBC # FLD AUTO: 4.17 K/UL — SIGNIFICANT CHANGE UP (ref 3.8–10.5)

## 2021-11-30 PROCEDURE — 93010 ELECTROCARDIOGRAM REPORT: CPT | Mod: 76

## 2021-11-30 PROCEDURE — 99232 SBSQ HOSP IP/OBS MODERATE 35: CPT | Mod: 25

## 2021-11-30 RX ORDER — LISINOPRIL 2.5 MG/1
2.5 TABLET ORAL DAILY
Refills: 0 | Status: DISCONTINUED | OUTPATIENT
Start: 2021-11-30 | End: 2021-12-01

## 2021-11-30 RX ORDER — SODIUM CHLORIDE 9 MG/ML
600 INJECTION INTRAMUSCULAR; INTRAVENOUS; SUBCUTANEOUS
Refills: 0 | Status: DISCONTINUED | OUTPATIENT
Start: 2021-11-30 | End: 2021-11-30

## 2021-11-30 RX ORDER — SODIUM CHLORIDE 9 MG/ML
600 INJECTION INTRAMUSCULAR; INTRAVENOUS; SUBCUTANEOUS
Refills: 0 | Status: COMPLETED | OUTPATIENT
Start: 2021-11-30 | End: 2021-11-30

## 2021-11-30 RX ADMIN — SODIUM CHLORIDE 100 MILLILITER(S): 9 INJECTION INTRAMUSCULAR; INTRAVENOUS; SUBCUTANEOUS at 12:31

## 2021-11-30 RX ADMIN — PANTOPRAZOLE SODIUM 40 MILLIGRAM(S): 20 TABLET, DELAYED RELEASE ORAL at 05:51

## 2021-11-30 RX ADMIN — Medication 12.5 MILLIGRAM(S): at 19:36

## 2021-11-30 RX ADMIN — CLOPIDOGREL BISULFATE 75 MILLIGRAM(S): 75 TABLET, FILM COATED ORAL at 11:25

## 2021-11-30 RX ADMIN — Medication 81 MILLIGRAM(S): at 11:25

## 2021-11-30 RX ADMIN — ATORVASTATIN CALCIUM 80 MILLIGRAM(S): 80 TABLET, FILM COATED ORAL at 21:13

## 2021-11-30 RX ADMIN — APALUTAMIDE 240 MILLIGRAM(S): 240 TABLET, FILM COATED ORAL at 12:02

## 2021-11-30 RX ADMIN — Medication 12.5 MILLIGRAM(S): at 05:51

## 2021-11-30 NOTE — DISCHARGE NOTE PROVIDER - CARE PROVIDER_API CALL
Jack Younger (DO)  Cardiovascular Disease; Internal Medicine  [address] [place] [address]  Phone: [phone]  Fax: [phone]  Follow Up Time:

## 2021-11-30 NOTE — DISCHARGE NOTE PROVIDER - HOSPITAL COURSE
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    Underwent LHC 11/29/21 with MV CAD, s/p JANETH  4.0 X 38 Synergy JANETH, now plan for stage PCI to p/mLAD with plan for PRCA and JANETH placement 11/30/21.      Now s/p PRCA and JANETH Synergy 3 X 48mm pLAD and Synergy 2.75 X 38mm mLAD via RRA, tolerated procedure well, procedure performed by Dr. Del Toro, received Heparin for A/C and IV sedation intraprocedurally, arrived to recovery in NAD and HDS, post PCI ECG with no acute or ischemic changes, RRA access site stable, no bleed/hematoma, distal pulse +, overnight adm given complex PCI requiring PRCA and JANETH X 2 p/mLAD.       CAD s/p JANETH mRCA 11/29/21 and PRCA abd JANETH X 2 p/mLAD 11/30/21   -patient seen and examined  -confirmed appropriate NPO duration  -ECG and Labs reviewed  -Cont metoprolol 12.5mg po q12hr  -Aspirin 81mg and Plavix 75mg po pre-cath  -Cont Lipitor 80mg po qHS  -procedure discussed with patient; risks and benefits explained, questions answered  -consent obtained by attending IC  -Formal cath report pending  -Post procedure management/monitoring per protocol  -Access site precautions  -Radial compression band removal at 1330  -Bedrest x 2hours post procedure  -Labs and EKG in am  -Repeat ECG if any clinical indication or change on tele  -Continue current medical therapy  -Dual anti platelet therapy with aspirin/plavix   -Cont BB with metoprolol 12.5mg po q12hr  -Cont high dose statin therapy with Lipitor 80mg po qHS   -Educated regarding strict adherence with DAPT   -Educated regarding post procedure management and care  -Discussed the importance of RF modification  -Cardiac rehab info provided/referral and communication to cardiac rehab completed  -Continue to follow up with cardiologist  -If without complications, per Dr. 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    Underwent LHC 11/29/21 with MV CAD, s/p JANETH  4.0 X 38 Synergy JANETH, now plan for stage PCI to p/mLAD with plan for PRCA and JANETH placement 11/30/21.      Now s/p PRCA and JANETH Synergy 3 X 48mm pLAD and Synergy 2.75 X 38mm mLAD via RRA, tolerated procedure well, procedure performed by Dr. Del Toro, received Heparin for A/C and IV sedation intraprocedurally, arrived to recovery in NAD and HDS, post PCI ECG with no acute or ischemic changes, RRA access site stable, no bleed/hematoma, distal pulse +, overnight adm given complex PCI requiring PRCA and JANETH X 2 p/mLAD.       CAD s/p JANETH mRCA 11/29/21 and PRCA abd JANETH X 2 p/mLAD 11/30/21   -patient seen and examined  -confirmed appropriate NPO duration  -ECG and Labs reviewed  -Cont metoprolol 12.5mg po q12hr  -Start lisinopril 2.5mg po daily  -Aspirin 81mg and Plavix 75mg po pre-cath  -Cont Lipitor 80mg po qHS  -procedure discussed with patient; risks and benefits explained, questions answered  -consent obtained by attending IC  -Formal cath report pending  -Post procedure management/monitoring per protocol  -Access site precautions  -Radial compression band removal at 1330  -Bedrest x 2hours post procedure  -Labs and EKG in am  -Repeat ECG if any clinical indication or change on tele  -Continue current medical therapy  -Dual anti platelet therapy with aspirin/plavix   -Cont BB with metoprolol 12.5mg po q12hr  -Cont high dose statin therapy with Lipitor 80mg po qHS   -Educated regarding strict adherence with DAPT   -Educated regarding post procedure management and care  -Discussed the importance of RF modification  -Cardiac rehab info provided/referral and communication to cardiac rehab completed  -Continue to follow up with cardiologist  -If without complications, per Dr. Del Toro, may travel one week post PCI   -DISPO: Plan for D/C in am if remains HDS, ECG and labs in am stable and without complications    HTN  -Cont metoprolol 12.5mg po q12hr  -Start lisinopril 2.5mg po daily  -monitor BP management/adjust as indicated  -low Na diet    HLD  -Cont Lipitor 80mg po qHS  -Low fat/choletserol diet    Prostate Cancer with LN involvement s/p prostatectomy  -Cont apalutamide and lupron  -cont f/u with urologist/onc    Full Code    Dispo:   -Overnight adm for complex PCI  -Post PCI management/monitoring per protocol  -Possible D/C 12/1/21         HPI:  65yo male with h/o HTN, HLD, GERD, and lymph node positive prostate cancer on apalutamide and Lupron, S/P radical prostatectomy, c/o a few of episodes of awakening with midsternal chest pressure, elevated CCS.    Underwent LHC 11/29/21 with MV CAD, s/p 4.0 X 38 Synergy JANETH, now plan for stage PCI to p/mLAD with plan for PRCA and JANETH placement 11/30/21.      Now s/p PRCA and JANETH Synergy 3 X 48mm pLAD and Synergy 2.75 X 38mm mLAD via RRA, tolerated procedure well, procedure performed by Dr. Del Toro, received Heparin for A/C and IV sedation intraprocedurally, arrived to recovery in NAD and HDS, post PCI ECG with no acute or ischemic changes, RRA access site stable, no bleed/hematoma, distal pulse +, overnight adm given complex PCI requiring PRCA and JANETH X 2 p/mLAD.       CAD s/p JANETH mRCA 11/29/21 and PRCA abd JANETH X 2 p/mLAD 11/30/21   -patient seen and examined  -confirmed appropriate NPO duration  -ECG and Labs reviewed  -Cont metoprolol 12.5mg po q12hr  -Start lisinopril 2.5mg po daily  -Aspirin 81mg and Plavix 75mg po pre-cath  -Cont Lipitor 80mg po qHS  -procedure discussed with patient; risks and benefits explained, questions answered  -consent obtained by attending IC  -Formal cath report pending  -Post procedure management/monitoring per protocol  -Access site precautions  -Radial compression band removal at 1330  -Bedrest x 2hours post procedure  -Labs and EKG in am  -Repeat ECG if any clinical indication or change on tele  -Continue current medical therapy  -Dual anti platelet therapy with aspirin/plavix   -Cont BB with metoprolol 12.5mg po q12hr  -Cont high dose statin therapy with Lipitor 80mg po qHS   -Educated regarding strict adherence with DAPT   -Educated regarding post procedure management and care  -Discussed the importance of RF modification  -Cardiac rehab info provided/referral and communication to cardiac rehab completed  -Continue to follow up with cardiologist  -If without complications, per Dr. Del Toro, may travel one week post PCI   -DISPO: Plan for D/C in am if remains HDS, ECG and labs in am stable and without complications    HTN  -Cont metoprolol 12.5mg po q12hr  -Start lisinopril 2.5mg po daily  -monitor BP management/adjust as indicated  -low Na diet    HLD  -Cont Lipitor 80mg po qHS  -Low fat/choletserol diet    Prostate Cancer with LN involvement s/p prostatectomy  -Cont apalutamide and lupron  -cont f/u with urologist/onc    Full Code    Dispo:   -Overnight adm for complex PCI  -Post PCI management/monitoring per protocol  -Possible D/C 12/1/21         HPI:  67yo male with h/o HTN, HLD, GERD, and lymph node positive prostate cancer on apalutamide and Lupron, S/P radical prostatectomy, c/o a few of episodes of awakening with midsternal chest pressure, elevated CCS.    Underwent LHC 11/29/21 with MV CAD, s/p 4.0 X 38 Synergy JANETH mRCA, now plan for stage PCI to p/mLAD with plan for PRCA and JANETH placement 11/30/21.      Now s/p PRCA and JANETH Synergy 3 X 48mm pLAD and Synergy 2.75 X 38mm mLAD via RRA, tolerated procedure well, procedure performed by Dr. Del Toro, received Heparin for A/C and IV sedation intraprocedurally, arrived to recovery in NAD and HDS, post PCI ECG with no acute or ischemic changes, RRA access site stable, no bleed/hematoma, distal pulse +, overnight adm given complex PCI requiring PRCA and JANETH X 2 p/mLAD.       CAD s/p JANETH mRCA 11/29/21 and PRCA abd JANETH X 2 p/mLAD 11/30/21   -patient seen and examined  -confirmed appropriate NPO duration  -ECG and Labs reviewed  -Cont metoprolol 12.5mg po q12hr  -Start lisinopril 2.5mg po daily  -Aspirin 81mg and Plavix 75mg po pre-cath  -Cont Lipitor 80mg po qHS  -procedure discussed with patient; risks and benefits explained, questions answered  -consent obtained by attending IC  -Formal cath report pending  -Post procedure management/monitoring per protocol  -Access site precautions  -Radial compression band removal at 1330  -Bedrest x 2hours post procedure  -Labs and EKG in am  -Repeat ECG if any clinical indication or change on tele  -Continue current medical therapy  -Dual anti platelet therapy with aspirin/plavix   -Cont BB with metoprolol 12.5mg po q12hr  -Cont high dose statin therapy with Lipitor 80mg po qHS   -Educated regarding strict adherence with DAPT   -Educated regarding post procedure management and care  -Discussed the importance of RF modification  -Cardiac rehab info provided/referral and communication to cardiac rehab completed  -Continue to follow up with cardiologist  -If without complications, per Dr. Del Toro, may travel one week post PCI   -DISPO: Plan for D/C in am if remains HDS, ECG and labs in am stable and without complications    HTN  -Cont metoprolol 12.5mg po q12hr  -Start lisinopril 2.5mg po daily  -monitor BP management/adjust as indicated  -low Na diet    HLD  -Cont Lipitor 80mg po qHS  -Low fat/choletserol diet    Prostate Cancer with LN involvement s/p prostatectomy  -Cont apalutamide and lupron  -cont f/u with urologist/onc    Full Code    Dispo:   -Overnight adm for complex PCI  -Post PCI management/monitoring per protocol  -Possible D/C 12/1/21         HPI:  67yo male with h/o HTN, HLD, GERD, and lymph node positive prostate cancer on apalutamide and Lupron, S/P radical prostatectomy, c/o a few of episodes of awakening with midsternal chest pressure, elevated CCS.    Underwent LHC 11/29/21 with MV CAD, s/p 4.0 X 38 Synergy JANETH mRCA, now plan for stage PCI to p/mLAD with plan for PRCA and JANETH placement 11/30/21.      Now s/p PRCA and JANETH Synergy 3 X 48mm pLAD and Synergy 2.75 X 38mm mLAD via RRA, tolerated procedure well, procedure performed by Dr. Del Toro, received Heparin for A/C and IV sedation intraprocedurally, arrived to recovery in NAD and HDS, post PCI ECG with no acute or ischemic changes, RRA access site stable, no bleed/hematoma, distal pulse +, overnight adm given complex PCI requiring PRCA and JANETH X 2 p/mLAD.       CAD s/p JANETH mRCA 11/29/21 and PRCA abd JANETH X 2 p/mLAD 11/30/21     -continue BB; will start Toprol XL 25 mg daily   -Start lisinopril 2.5mg po daily  -Cont Lipitor 80mg po qHS (Rx sent to pharmacy)   -Continue current medical therapy  -Dual anti platelet therapy with Aspirin 81 mg daily and Plavix 75 mg daily (Rx sent to pharmacy)  -Educated regarding strict adherence with DAPT   -Discussed the importance of risk factor modification  -Cardiac rehab info provided/referral and communication to cardiac rehab completed  -Continue to follow up with cardiologist  -If without complications, per Dr. Del Toro, may travel one week post PCI

## 2021-11-30 NOTE — DISCHARGE NOTE PROVIDER - NSDCFUADDINST_GEN_ALL_CORE_FT
You had a drug eluting stent placed to your mid right coronary artery as well as your prox and mid left anterior descending coronary artery during this admission.   Go to the ED with any acute onset of chest pain, palpitations, shortness of breath or dizziness. Do NOT miss a dose or stop taking your Aspirin and Plavix, these medications keep your stent open and prevent a heart attack. If anyone tells you to stop these medications, speak to your cardiologist immediately.  Restricted use with no heavy lifting of affected arm for 48 hours. No submerging the arm in water for 48 hours.  You may start showering today. Call your doctor for any bleeding, swelling, loss of sensation in the hand or fingers, or fingers turning blue. If heavy bleeding or large lumps form, hold pressure at the spot and come to the Emergency Room.  Managing risk factors will help keep your stent open and prevent future blockages, risk factors may include: high blood pressure, high cholesterol, diabetes, obesity, sedentary life style and smoking.   Your diet should be low in fat, cholesterol, salt and carbohydrates, increase fruits (caution if diabetic), vegetables and whole grains/fiber rich foods.  Take all your cardiac  medications as prescribed.   Exercise is a very important factor in heart health. Once your post procedure restrictions have passed, you should engage in heart healthy, aerobic exercise. Be sure to have clearance from your cardiologist. Cardiac rehab programs could be extremely beneficial and your cardiologist could help set this up.  Follow up with your cardiologist within 1-2 weeks after your procedure.  Call your cardiologist or our unit ([phone]) with any questions or concerns that may arise.

## 2021-11-30 NOTE — DISCHARGE NOTE PROVIDER - NSDCCPCAREPLAN_GEN_ALL_CORE_FT
PRINCIPAL DISCHARGE DIAGNOSIS  Diagnosis: Coronary artery disease involving native coronary artery of native heart with unstable angina pector  Assessment and Plan of Treatment: Coronary artery disease is the buildup of plaque in the arteries that supply oxygen-rich blood to your heart. Plaque causes a narrowing or blockage that could result in a heart attack. Symptoms include chest pain or discomfort, shortness of breath, dizziness, palpitations, fatigue or reduced exercise tolerance.   You had a drug eluting stent placed to your mid right coronary artery as well as your prox and mid left anterior descending coronary artery during this admission.   Go to the ED with any acute onset of chest pain, palpitations, shortness of breath or dizziness. Do NOT miss a dose or stop taking your Aspirin and Plavix, these medications keep your stent open and prevent a heart attack. If anyone tells you to stop these medications, speak to your cardiologist immediately.  Managing risk factors will help keep your stent open and prevent future blockages, risk factors may include: high blood pressure, high cholesterol, diabetes, obesity, sedentary life style and smoking.    Your diet should be low in fat, cholesterol, salt and carbohydrates, increase fruits (caution if diabetic), vegetables and whole grains/fiber rich foods.   Take all your cardiac  medications as prescribed.    Exercise is a very important factor in heart health. Once your post procedure restrictions have passed, you should engage in heart healthy, aerobic exercise. Be sure to have clearance from your cardiologist. Cardiac rehab programs could be extremely beneficial and your cardiologist could help set this up.   Follow up with your cardiologist within 1-2 weeks after your procedure.   Call your cardiologist or our unit ([phone]) with any questions or concerns that may arise.      SECONDARY DISCHARGE DIAGNOSES  Diagnosis: Primary hypertension  Assessment and Plan of Treatment: Continue to take antihypertensive medications as prescribed. Obtain a home blood pressure monitor (at any pharmacy or medical supply store) and monitor blood pressure trends. Call your doctor if you note you blood pressure to be running much higher or lower than usual. Limit salt intake.    Diagnosis: Pure hypercholesterolemia  Assessment and Plan of Treatment: Your diet should be low in fat, cholesterol, salt and carbohydrates, increase fruits (caution if diabetic), vegetables and whole grains/fiber rich foods. Take your cholesterol lowering medications as prescribed. Routine follow up lipid panels

## 2021-11-30 NOTE — DISCHARGE NOTE PROVIDER - NSDCMRMEDTOKEN_GEN_ALL_CORE_FT
apalutamide 60 mg oral tablet: 4 tab(s) orally once a day  aspirin 81 mg oral tablet:   atorvastatin 40 mg oral tablet: 1 tab(s) orally once a day  pantoprazole 40 mg oral delayed release tablet: 1 tab(s) orally once a day  tadalafil 5 mg oral tablet: 1 tab(s) orally once a day, once a week take 4 tab  valACYclovir 1 g oral tablet: 1 tab(s) orally 2 times a day for 7days as needed for sores   apalutamide 60 mg oral tablet: 4 tab(s) orally once a day  aspirin 81 mg oral tablet:   atorvastatin 80 mg oral tablet: 1 tab(s) orally once a day (at bedtime)  clopidogrel 75 mg oral tablet: 1 tab(s) orally once a day  lisinopril 2.5 mg oral tablet: 1 tab(s) orally once a day  pantoprazole 40 mg oral delayed release tablet: 1 tab(s) orally once a day  tadalafil 5 mg oral tablet: 1 tab(s) orally once a day, once a week take 4 tab  Toprol-XL 25 mg oral tablet, extended release: 1 tab(s) orally once a day   valACYclovir 1 g oral tablet: 1 tab(s) orally 2 times a day for 7days as needed for sores

## 2021-11-30 NOTE — DISCHARGE NOTE PROVIDER - NSDCFUADDAPPT_GEN_ALL_CORE_FT
Please call and make an appointment with Dr. Younger in 2-3 weeks.   Please take all of your medications as prescribed.

## 2021-11-30 NOTE — DISCHARGE NOTE PROVIDER - NSDCCPTREATMENT_GEN_ALL_CORE_FT
PRINCIPAL PROCEDURE  Procedure: PCI, with stent insertion  Findings and Treatment: You had a drug eluting stent placed to your mid right coronary artery as well as your prox and mid left anterior descending coronary artery during this admission.   Go to the ED with any acute onset of chest pain, palpitations, shortness of breath or dizziness. Do NOT miss a dose or stop taking your Aspirin and Plavix, these medications keep your stent open and prevent a heart attack. If anyone tells you to stop these medications, speak to your cardiologist immediately.  Restricted use with no heavy lifting of affected arm for 48 hours.  No submerging the arm in water for 48 hours.  You may start showering today.  Call your doctor for any bleeding, swelling, loss of sensation in the hand or fingers, or fingers turning blue.  If heavy bleeding or large lumps form, hold pressure at the spot and come to the Emergency Room.  Managing risk factors will help keep your stent open and prevent future blockages, risk factors may include: high blood pressure, high cholesterol, diabetes, obesity, sedentary life style and smoking.    Your diet should be low in fat, cholesterol, salt and carbohydrates, increase fruits (caution if diabetic), vegetables and whole grains/fiber rich foods.   Take all your cardiac  medications as prescribed.    Exercise is a very important factor in heart health. Once your post procedure restrictions have passed, you should engage in heart healthy, aerobic exercise. Be sure to have clearance from your cardiologist. Cardiac rehab programs could be extremely beneficial and your cardiologist could help set this up.   Follow up with your cardiologist within 1-2 weeks after your procedure.   Call your cardiologist or our unit ([phone]) with any questions or concerns that may arise.

## 2021-11-30 NOTE — CHART NOTE - NSCHARTNOTEFT_GEN_A_CORE
Now s/p PRCA and JANETH Synergy 3 X 48mm pLAD and Synergy 2.75 X 38mm mLAD via RRA, tolerated procedure well, procedure performed by Dr. Del Toro, received Heparin for A/C and IV sedation intraprocedurally, arrived to recovery in NAD and HDS, post PCI ECG with no acute or ischemic changes, RRA access site stable, no bleed/hematoma, distal pulse +, overnight adm given complex PCI requiring PRCA and JANETH X 2 p/mLAD.     Plan:  -Formal cath report pending  -Post procedure management/monitoring per protocol  -Access site precautions  -Radial compression band removal at 1330  -Bedrest x 2hours post procedure  -Labs and EKG in am  -Repeat ECG if any clinical indication or change on tele  -Continue current medical therapy  -Dual anti platelet therapy with aspirin/plavix   -Cont BB with metoprolol 12.5mg po q12hr  -Cont high dose statin therapy with Lipitor 80mg po qHS   -Educated regarding strict adherence with DAPT   -Educated regarding post procedure management and care  -Discussed the importance of RF modification  -Cardiac rehab info provided/referral and communication to cardiac rehab completed  -Continue to follow up with cardiologist  -If without complications, per Dr. Del Toro, may travel one week post PCI   -DISPO: Plan for D/C in am if remains HDS, ECG and labs in am stable and without complications

## 2021-11-30 NOTE — PROGRESS NOTE ADULT - SUBJECTIVE AND OBJECTIVE BOX
Department of Cardiology                                                                  [facility]/[place] [address] [facility]                                                            Telephone: [phone]. 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          Symptoms:        Angina (Class): IV       Ischemic Symptoms: resting chest pain    Heart Failure: no    Assessment of LVEF:       EF: 60-65%       Assessed by: TTE       Date: 10/15/21    Prior Cardiac Interventions:  11/29/21 LHC/PCI:  VENTRICLES: No LV gram was performed; however, a recent echocardiogram demonstrated an EF of 55 %.  CORONARY VESSELS: The coronary circulation is right dominant.  LM:     --  LM: Normal.  LAD:     --  Proximal LAD: There was a 70 % stenosis. The lesion was heavily calcified.  --  Mid LAD: There was a diffuse 80 % stenosis. The lesion was heavily calcified.  CX:     --  Mid circumflex: There was a 50 % stenosis.  RCA:     --  Proximal RCA: There was a 40 % stenosis.  --  Mid RCA: There was a 80 % stenosis. It was treated with a 4.0 X 38 Synergy JANETH post dilated to 4.5 mm.    Stress Test: 10/15/2021       Protocol: Brett       Duration of Exercise: 9:00       Symptoms: Fatigue       EKG Changes: 1 mm upsloping ST depressions in V4-5.       DTS: 9    Cardiac CT Calcium Score: 5/20/2021       LM: 216       LAD: 857       LCX: 173       RCA: 122       Total: 1368    Echo: 10/15/2021       LV: Normal, EF 60-65%       RV: Normal       LA: Normal       RA: Normal       Mitral Valve: Trace MR       Aortic Valve: Normal       Tricuspid Valve: Trivial TR       Pulmonic Valve: Normal       Pericardium: No evidence of pericardial effusion      Risk Assessments:  ASA: 3  Mallampati: 2  Bleeding Risk: 1%  Creatinine: 0.9  GFR: 84    Associated Risk Factors:        Cerebrovascular Disease: N/A       Chronic Lung Disease: N/A       Peripheral Arterial Disease: N/A       Chronic Kidney Disease (if yes, what is GFR): N/A       Uncontrolled Diabetes (if yes, what is HgbA1C or FBS): N/A       Poorly Controlled Hypertension (if yes, what is SBP): N/A       Morbid Obesity (if yes, what is BMI): N/A       History of Recent Ventricular Arrhythmia: N/A       Inability to Ambulate Safely: N/A       Need for Therapeutic Anticoagulation: N/A       Antiplatelet or Contrast Allergy: N/A    Antianginal Therapies:        Beta Blockers:  metoprolol       Calcium Channel Blockers:        Long Acting Nitrates:        Ranexa:     	  MEDICATIONS:  metoprolol tartrate 12.5 milliGRAM(s) Oral two times a day  pantoprazole    Tablet 40 milliGRAM(s) Oral before breakfast  atorvastatin 80 milliGRAM(s) Oral at bedtime  apalutamide 240 milliGRAM(s) Oral <User Schedule>  aspirin  chewable 81 milliGRAM(s) Oral daily  clopidogrel Tablet 75 milliGRAM(s) Oral daily        ROS: as stated above, otherwise negative    PHYSICAL EXAM:  Constitutional: A & O x 3  HEENT:   Normal oral mucosa, PERRL, EOMI	  Cardiovascular: Normal S1 S2, No JVD, No murmurs, No edema  Respiratory: Lungs clear to auscultation	  Gastrointestinal:  Soft, Non-tender, + BS	  Skin: No rashes, No ecchymoses, No cyanosis  Neurologic: Non-focal  Extremities: Normal range of motion, No clubbing, cyanosis or edema  Vascular: Peripheral pulses palpable 2+ bilaterally  RRA site no bleed/hematoma, RP 2+      T(C): 36.5 (11-30-21 @ 05:45), Max: 36.5 (11-30-21 @ 05:45)  HR: 67 (11-30-21 @ 08:15) (61 - 76)  BP: 142/75 (11-30-21 @ 08:15) (126/75 - 167/92)  RR: 15 (11-30-21 @ 08:15) (15 - 20)  SpO2: 98% (11-30-21 @ 08:15) (97% - 100%)  Wt(kg): --      I&O's Summary    29 Nov 2021 07:01  -  30 Nov 2021 07:00  --------------------------------------------------------  IN: 0 mL / OUT: 2100 mL / NET: -2100 mL        TELEMETRY: SR 70's 	      ECG: SR 73BPM 	    LABS:	 	                              12.1   4.17  )-----------( 207      ( 30 Nov 2021 05:39 )             35.6     11-30    140  |  105  |  16.0  ----------------------------<  106<H>  4.1   |  23.0  |  0.94    Ca    9.0      30 Nov 2021 05:39  Mg     2.2     11-30    TPro  7.0  /  Alb  4.4  /  TBili  0.3<L>  /  DBili  x   /  AST  26  /  ALT  24  /  AlkPhos  95  11-29    Lipid Profile:       LDL 73  HDL 58      Impression:  67yo male s/p C 11/29/21 with MV CAD, s/p JANETH  4.0 X 38 Synergy JANETH, now plan for stage PCI to p/mLAD with plan for PRCA and JANETH placement to be performed by Dr. Del Toro.      Plan:  -plan for PRCA/PCI LAD via RA vs FA  -patient seen and examined  -confirmed appropriate NPO duration  -ECG and Labs reviewed  -Aspirin 81mg and Plavix 75mg po pre-cath  -procedure discussed with patient; risks and benefits explained, questions answered  -consent obtained by attending IC                                                                               Department of Cardiology                                                                  [facility]/[place] [address] [facility]                                                            Telephone: [phone]. 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          Symptoms:        Angina (Class): IV       Ischemic Symptoms: resting chest pain    Heart Failure: no    Assessment of LVEF:       EF: 60-65%       Assessed by: TTE       Date: 10/15/21    Prior Cardiac Interventions:  11/29/21 LHC/PCI:  VENTRICLES: No LV gram was performed; however, a recent echocardiogram demonstrated an EF of 55 %.  CORONARY VESSELS: The coronary circulation is right dominant.  LM:     --  LM: Normal.  LAD:     --  Proximal LAD: There was a 70 % stenosis. The lesion was heavily calcified.  --  Mid LAD: There was a diffuse 80 % stenosis. The lesion was heavily calcified.  CX:     --  Mid circumflex: There was a 50 % stenosis.  RCA:     --  Proximal RCA: There was a 40 % stenosis.  --  Mid RCA: There was a 80 % stenosis. It was treated with a 4.0 X 38 Synergy JANETH post dilated to 4.5 mm.    Stress Test: 10/15/2021       Protocol: Brett       Duration of Exercise: 9:00       Symptoms: Fatigue       EKG Changes: 1 mm upsloping ST depressions in V4-5.       DTS: 9    Cardiac CT Calcium Score: 5/20/2021       LM: 216       LAD: 857       LCX: 173       RCA: 122       Total: 1368    Echo: 10/15/2021       LV: Normal, EF 60-65%       RV: Normal       LA: Normal       RA: Normal       Mitral Valve: Trace MR       Aortic Valve: Normal       Tricuspid Valve: Trivial TR       Pulmonic Valve: Normal       Pericardium: No evidence of pericardial effusion      Risk Assessments:  ASA: 3  Mallampati: 2  Bleeding Risk: 1%  Creatinine: 0.9  GFR: 84    Associated Risk Factors:        Cerebrovascular Disease: N/A       Chronic Lung Disease: N/A       Peripheral Arterial Disease: N/A       Chronic Kidney Disease (if yes, what is GFR): N/A       Uncontrolled Diabetes (if yes, what is HgbA1C or FBS): N/A       Poorly Controlled Hypertension (if yes, what is SBP): N/A       Morbid Obesity (if yes, what is BMI): N/A       History of Recent Ventricular Arrhythmia: N/A       Inability to Ambulate Safely: N/A       Need for Therapeutic Anticoagulation: N/A       Antiplatelet or Contrast Allergy: N/A    Antianginal Therapies:        Beta Blockers:  metoprolol       Calcium Channel Blockers:        Long Acting Nitrates:        Ranexa:     	  MEDICATIONS:  metoprolol tartrate 12.5 milliGRAM(s) Oral two times a day  pantoprazole    Tablet 40 milliGRAM(s) Oral before breakfast  atorvastatin 80 milliGRAM(s) Oral at bedtime  apalutamide 240 milliGRAM(s) Oral <User Schedule>  aspirin  chewable 81 milliGRAM(s) Oral daily  clopidogrel Tablet 75 milliGRAM(s) Oral daily        ROS: as stated above, otherwise negative    PHYSICAL EXAM:  Constitutional: A & O x 3  HEENT:   Normal oral mucosa, PERRL, EOMI	  Cardiovascular: Normal S1 S2, No JVD, No murmurs, No edema  Respiratory: Lungs clear to auscultation	  Gastrointestinal:  Soft, Non-tender, + BS	  Skin: No rashes, No ecchymoses, No cyanosis  Neurologic: Non-focal  Extremities: Normal range of motion, No clubbing, cyanosis or edema  Vascular: Peripheral pulses palpable 2+ bilaterally  RRA site no bleed/hematoma, RP 2+      T(C): 36.5 (11-30-21 @ 05:45), Max: 36.5 (11-30-21 @ 05:45)  HR: 67 (11-30-21 @ 08:15) (61 - 76)  BP: 142/75 (11-30-21 @ 08:15) (126/75 - 167/92)  RR: 15 (11-30-21 @ 08:15) (15 - 20)  SpO2: 98% (11-30-21 @ 08:15) (97% - 100%)  Wt(kg): --      I&O's Summary    29 Nov 2021 07:01  -  30 Nov 2021 07:00  --------------------------------------------------------  IN: 0 mL / OUT: 2100 mL / NET: -2100 mL        TELEMETRY: SR 70's 	      ECG: SR 73BPM 	    LABS:	 	                              12.1   4.17  )-----------( 207      ( 30 Nov 2021 05:39 )             35.6     11-30    140  |  105  |  16.0  ----------------------------<  106<H>  4.1   |  23.0  |  0.94    Ca    9.0      30 Nov 2021 05:39  Mg     2.2     11-30    TPro  7.0  /  Alb  4.4  /  TBili  0.3<L>  /  DBili  x   /  AST  26  /  ALT  24  /  AlkPhos  95  11-29    Lipid Profile:       LDL 73  HDL 58      Impression:  65yo male s/p LHC 11/29/21 with MV CAD, s/p JANETH  4.0 X 38 Synergy JANETH, now plan for stage PCI to p/mLAD with plan for PRCA and JANETH placement to be performed by Dr. Del Toro.      CAD s/p JANETH mRCA 11/29/21 with plan for PRCA/PCI LAD today  -patient seen and examined  -confirmed appropriate NPO duration  -ECG and Labs reviewed  -Cont metoprolol 12.5mg po q12hr  -Aspirin 81mg and Plavix 75mg po pre-cath  -Cont Lipitor 80mg po qHS  -procedure discussed with patient; risks and benefits explained, questions answered  -consent obtained by attending IC      HTN  -Cont metoprolol 12.5mg po q12hr  -monitor BP management/adjust as indicated  -low Na diet    HLD  -Cont Lipitor 80mg po qHS  -Low fat/choletserol diet    Prostate Cancer with LN involvement s/p prostatectomy  -Cont apalutamide and lupron  -cont f/u with urologist/onc    Full Code    Dispo:   -PRCA/PCI LAD  -Overnight adm for complex PCI  -Post PCI management/monitoring per protocol  -Possible D/C 12/1/21                                                                         Department of Cardiology                                                                  [facility]/[place] [address]  [place]                                                            Telephone: [phone]. 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          Symptoms:        Angina (Class): IV       Ischemic Symptoms: resting chest pain    Heart Failure: no    Assessment of LVEF:       EF: 60-65%       Assessed by: TTE       Date: 10/15/21    Prior Cardiac Interventions:  11/29/21 LHC/PCI:  VENTRICLES: No LV gram was performed; however, a recent echocardiogram demonstrated an EF of 55 %.  CORONARY VESSELS: The coronary circulation is right dominant.  LM:     --  LM: Normal.  LAD:     --  Proximal LAD: There was a 70 % stenosis. The lesion was heavily calcified.  --  Mid LAD: There was a diffuse 80 % stenosis. The lesion was heavily calcified.  CX:     --  Mid circumflex: There was a 50 % stenosis.  RCA:     --  Proximal RCA: There was a 40 % stenosis.  --  Mid RCA: There was a 80 % stenosis. It was treated with a 4.0 X 38 Synergy JANETH post dilated to 4.5 mm.    Stress Test: 10/15/2021       Protocol: Brett       Duration of Exercise: 9:00       Symptoms: Fatigue       EKG Changes: 1 mm upsloping ST depressions in V4-5.       DTS: 9    Cardiac CT Calcium Score: 5/20/2021       LM: 216       LAD: 857       LCX: 173       RCA: 122       Total: 1368    Echo: 10/15/2021       LV: Normal, EF 60-65%       RV: Normal       LA: Normal       RA: Normal       Mitral Valve: Trace MR       Aortic Valve: Normal       Tricuspid Valve: Trivial TR       Pulmonic Valve: Normal       Pericardium: No evidence of pericardial effusion      Risk Assessments:  ASA: 3  Mallampati: 2  Bleeding Risk: 1%  Creatinine: 0.9  GFR: 84    Associated Risk Factors:        Cerebrovascular Disease: N/A       Chronic Lung Disease: N/A       Peripheral Arterial Disease: N/A       Chronic Kidney Disease (if yes, what is GFR): N/A       Uncontrolled Diabetes (if yes, what is HgbA1C or FBS): N/A       Poorly Controlled Hypertension (if yes, what is SBP): N/A       Morbid Obesity (if yes, what is BMI): N/A       History of Recent Ventricular Arrhythmia: N/A       Inability to Ambulate Safely: N/A       Need for Therapeutic Anticoagulation: N/A       Antiplatelet or Contrast Allergy: N/A    Antianginal Therapies:        Beta Blockers:  metoprolol       Calcium Channel Blockers:        Long Acting Nitrates:        Ranexa:     	  MEDICATIONS:  metoprolol tartrate 12.5 milliGRAM(s) Oral two times a day  pantoprazole    Tablet 40 milliGRAM(s) Oral before breakfast  atorvastatin 80 milliGRAM(s) Oral at bedtime  apalutamide 240 milliGRAM(s) Oral <User Schedule>  aspirin  chewable 81 milliGRAM(s) Oral daily  clopidogrel Tablet 75 milliGRAM(s) Oral daily        ROS: as stated above, otherwise negative    PHYSICAL EXAM:  Constitutional: A & O x 3  HEENT:   Normal oral mucosa, PERRL, EOMI	  Cardiovascular: Normal S1 S2, No JVD, No murmurs, No edema  Respiratory: Lungs clear to auscultation	  Gastrointestinal:  Soft, Non-tender, + BS	  Skin: No rashes, No ecchymoses, No cyanosis  Neurologic: Non-focal  Extremities: Normal range of motion, No clubbing, cyanosis or edema  Vascular: Peripheral pulses palpable 2+ bilaterally  RRA site no bleed/hematoma, RP 2+      T(C): 36.5 (11-30-21 @ 05:45), Max: 36.5 (11-30-21 @ 05:45)  HR: 67 (11-30-21 @ 08:15) (61 - 76)  BP: 142/75 (11-30-21 @ 08:15) (126/75 - 167/92)  RR: 15 (11-30-21 @ 08:15) (15 - 20)  SpO2: 98% (11-30-21 @ 08:15) (97% - 100%)  Wt(kg): --      I&O's Summary    29 Nov 2021 07:01  -  30 Nov 2021 07:00  --------------------------------------------------------  IN: 0 mL / OUT: 2100 mL / NET: -2100 mL        TELEMETRY: SR 70's 	      ECG: SR 73BPM 	    LABS:	 	                              12.1   4.17  )-----------( 207      ( 30 Nov 2021 05:39 )             35.6     11-30    140  |  105  |  16.0  ----------------------------<  106<H>  4.1   |  23.0  |  0.94    Ca    9.0      30 Nov 2021 05:39  Mg     2.2     11-30    TPro  7.0  /  Alb  4.4  /  TBili  0.3<L>  /  DBili  x   /  AST  26  /  ALT  24  /  AlkPhos  95  11-29    Lipid Profile:       LDL 73  HDL 58      Impression:  67yo male s/p LHC 11/29/21 with MV CAD, s/p JANETH  4.0 X 38 Synergy JANETH, now plan for stage PCI to p/mLAD with plan for PRCA and JANETH placement to be performed by Dr. Del Toro.      CAD s/p JANETH mRCA 11/29/21 with plan for PRCA/PCI LAD today  -patient seen and examined  -confirmed appropriate NPO duration  -ECG and Labs reviewed  -Cont metoprolol 12.5mg po q12hr  -Aspirin 81mg and Plavix 75mg po pre-cath  -Cont Lipitor 80mg po qHS  -procedure discussed with patient; risks and benefits explained, questions answered  -consent obtained by attending IC      HTN  -Cont metoprolol 12.5mg po q12hr  -monitor BP management/adjust as indicated  -low Na diet    HLD  -Cont Lipitor 80mg po qHS  -Low fat/choletserol diet    Prostate Cancer with LN involvement s/p prostatectomy  -Cont apalutamide and lupron  -cont f/u with urologist/onc    Full Code    Dispo:   -PRCA/PCI LAD  -Overnight adm for complex PCI  -Post PCI management/monitoring per protocol  -Possible D/C 12/1/21                                                                         Department of Cardiology                                                                  [facility]/[place] [address]  [place]                                                            Telephone: [phone]. 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          Symptoms:        Angina (Class): IV       Ischemic Symptoms: resting chest pain    Heart Failure: no    Assessment of LVEF:       EF: 60-65%       Assessed by: TTE       Date: 10/15/21    Prior Cardiac Interventions:  11/29/21 LHC/PCI:  VENTRICLES: No LV gram was performed; however, a recent echocardiogram demonstrated an EF of 55 %.  CORONARY VESSELS: The coronary circulation is right dominant.  LM:     --  LM: Normal.  LAD:     --  Proximal LAD: There was a 70 % stenosis. The lesion was heavily calcified.  --  Mid LAD: There was a diffuse 80 % stenosis. The lesion was heavily calcified.  CX:     --  Mid circumflex: There was a 50 % stenosis.  RCA:     --  Proximal RCA: There was a 40 % stenosis.  --  Mid RCA: There was a 80 % stenosis. It was treated with a 4.0 X 38 Synergy JANETH post dilated to 4.5 mm.    Stress Test: 10/15/2021       Protocol: Brett       Duration of Exercise: 9:00       Symptoms: Fatigue       EKG Changes: 1 mm upsloping ST depressions in V4-5.       DTS: 9    Cardiac CT Calcium Score: 5/20/2021       LM: 216       LAD: 857       LCX: 173       RCA: 122       Total: 1368    Echo: 10/15/2021       LV: Normal, EF 60-65%       RV: Normal       LA: Normal       RA: Normal       Mitral Valve: Trace MR       Aortic Valve: Normal       Tricuspid Valve: Trivial TR       Pulmonic Valve: Normal       Pericardium: No evidence of pericardial effusion      Risk Assessments:  ASA: 3  Mallampati: 2  Bleeding Risk: 1%  Creatinine: 0.9  GFR: 84    Associated Risk Factors:        Cerebrovascular Disease: N/A       Chronic Lung Disease: N/A       Peripheral Arterial Disease: N/A       Chronic Kidney Disease (if yes, what is GFR): N/A       Uncontrolled Diabetes (if yes, what is HgbA1C or FBS): N/A       Poorly Controlled Hypertension (if yes, what is SBP): N/A       Morbid Obesity (if yes, what is BMI): N/A       History of Recent Ventricular Arrhythmia: N/A       Inability to Ambulate Safely: N/A       Need for Therapeutic Anticoagulation: N/A       Antiplatelet or Contrast Allergy: N/A    Antianginal Therapies:        Beta Blockers:  metoprolol       Calcium Channel Blockers:        Long Acting Nitrates:        Ranexa:     	  MEDICATIONS:  metoprolol tartrate 12.5 milliGRAM(s) Oral two times a day  pantoprazole    Tablet 40 milliGRAM(s) Oral before breakfast  atorvastatin 80 milliGRAM(s) Oral at bedtime  apalutamide 240 milliGRAM(s) Oral <User Schedule>  aspirin  chewable 81 milliGRAM(s) Oral daily  clopidogrel Tablet 75 milliGRAM(s) Oral daily        ROS: as stated above, otherwise negative    PHYSICAL EXAM:  Constitutional: A & O x 3  HEENT:   Normal oral mucosa, PERRL, EOMI	  Cardiovascular: Normal S1 S2, No JVD, No murmurs, No edema  Respiratory: Lungs clear to auscultation	  Gastrointestinal:  Soft, Non-tender, + BS	  Skin: No rashes, No ecchymoses, No cyanosis  Neurologic: Non-focal  Extremities: Normal range of motion, No clubbing, cyanosis or edema  Vascular: Peripheral pulses palpable 2+ bilaterally  RRA site no bleed/hematoma, RP 2+      T(C): 36.5 (11-30-21 @ 05:45), Max: 36.5 (11-30-21 @ 05:45)  HR: 67 (11-30-21 @ 08:15) (61 - 76)  BP: 142/75 (11-30-21 @ 08:15) (126/75 - 167/92)  RR: 15 (11-30-21 @ 08:15) (15 - 20)  SpO2: 98% (11-30-21 @ 08:15) (97% - 100%)  Wt(kg): --      I&O's Summary    29 Nov 2021 07:01  -  30 Nov 2021 07:00  --------------------------------------------------------  IN: 0 mL / OUT: 2100 mL / NET: -2100 mL        TELEMETRY: SR 70's 	      ECG: SR 73BPM 	    LABS:	 	                              12.1   4.17  )-----------( 207      ( 30 Nov 2021 05:39 )             35.6     11-30    140  |  105  |  16.0  ----------------------------<  106<H>  4.1   |  23.0  |  0.94    Ca    9.0      30 Nov 2021 05:39  Mg     2.2     11-30    TPro  7.0  /  Alb  4.4  /  TBili  0.3<L>  /  DBili  x   /  AST  26  /  ALT  24  /  AlkPhos  95  11-29    Lipid Profile:       LDL 73  HDL 58      Impression:  65yo male s/p LHC 11/29/21 with MV CAD, s/p JANETH  4.0 X 38 Synergy JANETH, now plan for stage PCI to p/mLAD with plan for PRCA and JANETH placement to be performed by Dr. Del Toro.      CAD s/p JANETH mRCA 11/29/21 with plan for PRCA/PCI LAD today  -patient seen and examined  -confirmed appropriate NPO duration  -ECG and Labs reviewed  -Cont metoprolol 12.5mg po q12hr  -Aspirin 81mg and Plavix 75mg po pre-cath  -Cont Lipitor 80mg po qHS  -procedure discussed with patient; risks and benefits explained, questions answered  -consent obtained by attending IC      HTN  -Cont metoprolol 12.5mg po q12hr  -monitor BP management/adjust as indicated  -low Na diet    HLD  -Cont Lipitor 80mg po qHS  -Low fat/choletserol diet    Prostate Cancer with LN involvement s/p prostatectomy  -Cont apalutamide and lupron  -cont f/u with urologist/onc    Full Code    Dispo:   -PRCA/PCI LAD  -Overnight adm for complex PCI  -Post PCI management/monitoring per protocol  -Possible D/C 12/1/21                                                                         Department of Cardiology                                                                  [facility]/[place] [address]  [place]                                                            Telephone: [phone]. 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          Symptoms:        Angina (Class): IV       Ischemic Symptoms: resting chest pain    Heart Failure: no    Assessment of LVEF:       EF: 60-65%       Assessed by: TTE       Date: 10/15/21    Prior Cardiac Interventions:  11/29/21 LHC/PCI:  VENTRICLES: No LV gram was performed; however, a recent echocardiogram demonstrated an EF of 55 %.  CORONARY VESSELS: The coronary circulation is right dominant.  LM:     --  LM: Normal.  LAD:     --  Proximal LAD: There was a 70 % stenosis. The lesion was heavily calcified.  --  Mid LAD: There was a diffuse 80 % stenosis. The lesion was heavily calcified.  CX:     --  Mid circumflex: There was a 50 % stenosis.  RCA:     --  Proximal RCA: There was a 40 % stenosis.  --  Mid RCA: There was a 80 % stenosis. It was treated with a 4.0 X 38 Synergy JANETH post dilated to 4.5 mm.    Stress Test: 10/15/2021       Protocol: Brett       Duration of Exercise: 9:00       Symptoms: Fatigue       EKG Changes: 1 mm upsloping ST depressions in V4-5.       DTS: 9    Cardiac CT Calcium Score: 5/20/2021       LM: 216       LAD: 857       LCX: 173       RCA: 122       Total: 1368    Echo: 10/15/2021       LV: Normal, EF 60-65%       RV: Normal       LA: Normal       RA: Normal       Mitral Valve: Trace MR       Aortic Valve: Normal       Tricuspid Valve: Trivial TR       Pulmonic Valve: Normal       Pericardium: No evidence of pericardial effusion      Risk Assessments:  ASA: 3  Mallampati: 2  Bleeding Risk: 1%  Creatinine: 0.9  GFR: 84    Associated Risk Factors:        Cerebrovascular Disease: N/A       Chronic Lung Disease: N/A       Peripheral Arterial Disease: N/A       Chronic Kidney Disease (if yes, what is GFR): N/A       Uncontrolled Diabetes (if yes, what is HgbA1C or FBS): N/A       Poorly Controlled Hypertension (if yes, what is SBP): N/A       Morbid Obesity (if yes, what is BMI): N/A       History of Recent Ventricular Arrhythmia: N/A       Inability to Ambulate Safely: N/A       Need for Therapeutic Anticoagulation: N/A       Antiplatelet or Contrast Allergy: N/A    Antianginal Therapies:        Beta Blockers:  metoprolol       Calcium Channel Blockers:        Long Acting Nitrates:        Ranexa:     	  MEDICATIONS:  metoprolol tartrate 12.5 milliGRAM(s) Oral two times a day  pantoprazole    Tablet 40 milliGRAM(s) Oral before breakfast  atorvastatin 80 milliGRAM(s) Oral at bedtime  apalutamide 240 milliGRAM(s) Oral <User Schedule>  aspirin  chewable 81 milliGRAM(s) Oral daily  clopidogrel Tablet 75 milliGRAM(s) Oral daily        ROS: as stated above, otherwise negative    PHYSICAL EXAM:  Constitutional: A & O x 3  HEENT:   Normal oral mucosa, PERRL, EOMI	  Cardiovascular: Normal S1 S2, No JVD, No murmurs, No edema  Respiratory: Lungs clear to auscultation	  Gastrointestinal:  Soft, Non-tender, + BS	  Skin: No rashes, No ecchymoses, No cyanosis  Neurologic: Non-focal  Extremities: Normal range of motion, No clubbing, cyanosis or edema  Vascular: Peripheral pulses palpable 2+ bilaterally  RRA site no bleed/hematoma, RP 2+      T(C): 36.5 (11-30-21 @ 05:45), Max: 36.5 (11-30-21 @ 05:45)  HR: 67 (11-30-21 @ 08:15) (61 - 76)  BP: 142/75 (11-30-21 @ 08:15) (126/75 - 167/92)  RR: 15 (11-30-21 @ 08:15) (15 - 20)  SpO2: 98% (11-30-21 @ 08:15) (97% - 100%)  Wt(kg): --      I&O's Summary    29 Nov 2021 07:01  -  30 Nov 2021 07:00  --------------------------------------------------------  IN: 0 mL / OUT: 2100 mL / NET: -2100 mL        TELEMETRY: SR 70's 	      ECG: SR 73BPM 	    LABS:	 	                              12.1   4.17  )-----------( 207      ( 30 Nov 2021 05:39 )             35.6     11-30    140  |  105  |  16.0  ----------------------------<  106<H>  4.1   |  23.0  |  0.94    Ca    9.0      30 Nov 2021 05:39  Mg     2.2     11-30    TPro  7.0  /  Alb  4.4  /  TBili  0.3<L>  /  DBili  x   /  AST  26  /  ALT  24  /  AlkPhos  95  11-29    Lipid Profile:       LDL 73  HDL 58      Impression:  65yo male s/p LHC 11/29/21 with MV CAD, s/p JANETH  4.0 X 38 Synergy JANETH to mRCA, now plan for stage PCI to p/mLAD with plan for PRCA and JANETH placement to be performed by Dr. Del Toro.      CAD s/p JANETH mRCA 11/29/21 with plan for PRCA/PCI LAD today  -patient seen and examined  -confirmed appropriate NPO duration  -ECG and Labs reviewed  -Cont metoprolol 12.5mg po q12hr  -Aspirin 81mg and Plavix 75mg po pre-cath  -Cont Lipitor 80mg po qHS  -procedure discussed with patient; risks and benefits explained, questions answered  -consent obtained by attending IC      HTN  -Cont metoprolol 12.5mg po q12hr  -monitor BP management/adjust as indicated  -low Na diet    HLD  -Cont Lipitor 80mg po qHS  -Low fat/choletserol diet    Prostate Cancer with LN involvement s/p prostatectomy  -Cont apalutamide and lupron  -cont f/u with urologist/onc    Full Code    Dispo:   -PRCA/PCI LAD  -Overnight adm for complex PCI  -Post PCI management/monitoring per protocol  -Possible D/C 12/1/21

## 2021-11-30 NOTE — PROGRESS NOTE ADULT - SUBJECTIVE AND OBJECTIVE BOX
Full report to follow.     Rotational atherectomy and PCI to LAD with 2 JANETH.   Aspirin and plavix.

## 2021-12-01 ENCOUNTER — TRANSCRIPTION ENCOUNTER (OUTPATIENT)
Age: 66
End: 2021-12-01

## 2021-12-01 VITALS
HEART RATE: 78 BPM | TEMPERATURE: 98 F | OXYGEN SATURATION: 97 % | SYSTOLIC BLOOD PRESSURE: 129 MMHG | RESPIRATION RATE: 16 BRPM | DIASTOLIC BLOOD PRESSURE: 75 MMHG

## 2021-12-01 LAB
ALBUMIN SERPL ELPH-MCNC: 4.1 G/DL — SIGNIFICANT CHANGE UP (ref 3.3–5.2)
ALP SERPL-CCNC: 88 U/L — SIGNIFICANT CHANGE UP (ref 40–120)
ALT FLD-CCNC: 22 U/L — SIGNIFICANT CHANGE UP
ANION GAP SERPL CALC-SCNC: 16 MMOL/L — SIGNIFICANT CHANGE UP (ref 5–17)
AST SERPL-CCNC: 31 U/L — SIGNIFICANT CHANGE UP
BILIRUB SERPL-MCNC: 0.3 MG/DL — LOW (ref 0.4–2)
BUN SERPL-MCNC: 18.2 MG/DL — SIGNIFICANT CHANGE UP (ref 8–20)
CALCIUM SERPL-MCNC: 9.1 MG/DL — SIGNIFICANT CHANGE UP (ref 8.6–10.2)
CHLORIDE SERPL-SCNC: 103 MMOL/L — SIGNIFICANT CHANGE UP (ref 98–107)
CO2 SERPL-SCNC: 21 MMOL/L — LOW (ref 22–29)
CREAT SERPL-MCNC: 0.86 MG/DL — SIGNIFICANT CHANGE UP (ref 0.5–1.3)
GLUCOSE SERPL-MCNC: 109 MG/DL — HIGH (ref 70–99)
HCT VFR BLD CALC: 35.1 % — LOW (ref 39–50)
HGB BLD-MCNC: 12.1 G/DL — LOW (ref 13–17)
MAGNESIUM SERPL-MCNC: 2.2 MG/DL — SIGNIFICANT CHANGE UP (ref 1.6–2.6)
MCHC RBC-ENTMCNC: 32.6 PG — SIGNIFICANT CHANGE UP (ref 27–34)
MCHC RBC-ENTMCNC: 34.5 GM/DL — SIGNIFICANT CHANGE UP (ref 32–36)
MCV RBC AUTO: 94.6 FL — SIGNIFICANT CHANGE UP (ref 80–100)
PLATELET # BLD AUTO: 201 K/UL — SIGNIFICANT CHANGE UP (ref 150–400)
POTASSIUM SERPL-MCNC: 4.5 MMOL/L — SIGNIFICANT CHANGE UP (ref 3.5–5.3)
POTASSIUM SERPL-SCNC: 4.5 MMOL/L — SIGNIFICANT CHANGE UP (ref 3.5–5.3)
PROT SERPL-MCNC: 6.7 G/DL — SIGNIFICANT CHANGE UP (ref 6.6–8.7)
RBC # BLD: 3.71 M/UL — LOW (ref 4.2–5.8)
RBC # FLD: 13.4 % — SIGNIFICANT CHANGE UP (ref 10.3–14.5)
SODIUM SERPL-SCNC: 140 MMOL/L — SIGNIFICANT CHANGE UP (ref 135–145)
WBC # BLD: 4.61 K/UL — SIGNIFICANT CHANGE UP (ref 3.8–10.5)
WBC # FLD AUTO: 4.61 K/UL — SIGNIFICANT CHANGE UP (ref 3.8–10.5)

## 2021-12-01 PROCEDURE — C1874: CPT

## 2021-12-01 PROCEDURE — C9600: CPT | Mod: RC

## 2021-12-01 PROCEDURE — C1753: CPT

## 2021-12-01 PROCEDURE — 36415 COLL VENOUS BLD VENIPUNCTURE: CPT

## 2021-12-01 PROCEDURE — 80048 BASIC METABOLIC PNL TOTAL CA: CPT

## 2021-12-01 PROCEDURE — 85027 COMPLETE CBC AUTOMATED: CPT

## 2021-12-01 PROCEDURE — C9602: CPT | Mod: LD

## 2021-12-01 PROCEDURE — C1894: CPT

## 2021-12-01 PROCEDURE — 99153 MOD SED SAME PHYS/QHP EA: CPT

## 2021-12-01 PROCEDURE — 85025 COMPLETE CBC W/AUTO DIFF WBC: CPT

## 2021-12-01 PROCEDURE — C1725: CPT

## 2021-12-01 PROCEDURE — 80053 COMPREHEN METABOLIC PANEL: CPT

## 2021-12-01 PROCEDURE — 93458 L HRT ARTERY/VENTRICLE ANGIO: CPT | Mod: XU

## 2021-12-01 PROCEDURE — 83036 HEMOGLOBIN GLYCOSYLATED A1C: CPT

## 2021-12-01 PROCEDURE — 99152 MOD SED SAME PHYS/QHP 5/>YRS: CPT

## 2021-12-01 PROCEDURE — 86901 BLOOD TYPING SEROLOGIC RH(D): CPT

## 2021-12-01 PROCEDURE — C1887: CPT

## 2021-12-01 PROCEDURE — 93010 ELECTROCARDIOGRAM REPORT: CPT

## 2021-12-01 PROCEDURE — 92978 ENDOLUMINL IVUS OCT C 1ST: CPT | Mod: RC

## 2021-12-01 PROCEDURE — 83735 ASSAY OF MAGNESIUM: CPT

## 2021-12-01 PROCEDURE — 86900 BLOOD TYPING SEROLOGIC ABO: CPT

## 2021-12-01 PROCEDURE — C1724: CPT

## 2021-12-01 PROCEDURE — 80061 LIPID PANEL: CPT

## 2021-12-01 PROCEDURE — 99232 SBSQ HOSP IP/OBS MODERATE 35: CPT

## 2021-12-01 PROCEDURE — 86850 RBC ANTIBODY SCREEN: CPT

## 2021-12-01 PROCEDURE — C1769: CPT

## 2021-12-01 PROCEDURE — 93005 ELECTROCARDIOGRAM TRACING: CPT

## 2021-12-01 RX ORDER — ATORVASTATIN CALCIUM 80 MG/1
1 TABLET, FILM COATED ORAL
Qty: 90 | Refills: 3
Start: 2021-12-01 | End: 2022-11-25

## 2021-12-01 RX ORDER — ATORVASTATIN CALCIUM 80 MG/1
1 TABLET, FILM COATED ORAL
Qty: 0 | Refills: 0 | DISCHARGE

## 2021-12-01 RX ORDER — CLOPIDOGREL BISULFATE 75 MG/1
1 TABLET, FILM COATED ORAL
Qty: 90 | Refills: 3
Start: 2021-12-01 | End: 2022-11-25

## 2021-12-01 RX ORDER — METOPROLOL TARTRATE 50 MG
1 TABLET ORAL
Qty: 90 | Refills: 0
Start: 2021-12-01 | End: 2022-02-28

## 2021-12-01 RX ORDER — LISINOPRIL 2.5 MG/1
1 TABLET ORAL
Qty: 90 | Refills: 3
Start: 2021-12-01 | End: 2022-11-25

## 2021-12-01 RX ADMIN — Medication 12.5 MILLIGRAM(S): at 06:13

## 2021-12-01 RX ADMIN — LISINOPRIL 2.5 MILLIGRAM(S): 2.5 TABLET ORAL at 06:12

## 2021-12-01 RX ADMIN — PANTOPRAZOLE SODIUM 40 MILLIGRAM(S): 20 TABLET, DELAYED RELEASE ORAL at 06:12

## 2021-12-01 NOTE — DISCHARGE NOTE NURSING/CASE MANAGEMENT/SOCIAL WORK - NSDCPEFALRISK_GEN_ALL_CORE
For information on Fall & Injury Prevention, visit: [email or web address] OR  [email or web address] OR  https://www.cdc.gov/steadi/patient.html

## 2021-12-01 NOTE — DISCHARGE NOTE NURSING/CASE MANAGEMENT/SOCIAL WORK - PATIENT PORTAL LINK FT
You can access the FollowMyHealth Patient Portal offered by [facility] by registering at the following website: [email or web address]. By joining [employer]’s FollowMyHealth portal, you will also be able to view your health information using other applications (apps) compatible with our system.

## 2021-12-01 NOTE — PROGRESS NOTE ADULT - SUBJECTIVE AND OBJECTIVE BOX
Department of Cardiology                                              [facility]/[place] [address] [id number]                                          Telephone: [phone]. Fax:[phone]                                               Cardiac Catheterization Progress Note       Narrative:        67yo male with h/o HTN, HLD, GERD, and lymph node positive prostate cancer on apalutamide and Lupron, S/P radical prostatectomy, c/o a few of episodes of awakening with midsternal chest pressure, elevated CCS and underwent LHC 11/29/21 with MV CAD, s/p 4.0 X 38 Synergy JANETH to mRCA, now plan for stage PCI to p/mLAD with plan for PRCA and JANETH placement.          PAST MEDICAL & SURGICAL HISTORY:  GERD (gastroesophageal reflux disease)  Primary hypertension  Prostate cancer  Pure hypercholesterolemia  History of radical prostatectomy  History of left inguinal hernia repair  History of appendectomy  History of knee surgery      FAMILY HISTORY:  Family history of hypertension (Father)  Family history of malignant neoplasm of prostate (Father)  Family history of dementia (Mother)      Home Medications:  apalutamide 60 mg oral tablet: 4 tab(s) orally once a day (29 Nov 2021 19:29)  aspirin 81 mg oral tablet:  (29 Nov 2021 19:29)  pantoprazole 40 mg oral delayed release tablet: 1 tab(s) orally once a day (29 Nov 2021 19:29)  tadalafil 5 mg oral tablet: 1 tab(s) orally once a day, once a week take 4 tab (29 Nov 2021 19:29)  valACYclovir 1 g oral tablet: 1 tab(s) orally 2 times a day for 7days as needed for sores (29 Nov 2021 19:29)       12.1   4.61  )-----------( 201      ( 01 Dec 2021 06:41 )             35.1     12-01    140  |  103  |  18.2  ----------------------------<  109<H>  4.5   |  21.0<L>  |  0.86    Ca    9.1      01 Dec 2021 06:41  Mg     2.2     12-01    TPro  6.7  /  Alb  4.1  /  TBili  0.3<L>  /  DBili  x   /  AST  31  /  ALT  22  /  AlkPhos  88  12-01        ROS:   General: No fevers/chills, no fatigue, + hot flashes.  HEENT: No visual disturbances, no hearing loss, no headaches, no epistaxis.  CV: + nonexertional chest pain, + MAZARIEGOS, no palpitations, no edema, no orthopnea, no PND.  Respiratory: No dyspnea, no wheeze, no cough.  GI: no nausea/vomiting, no black/bloody stools.  : No hematuria  Musculoskeletal: No myalgias, right shoulder pain, no back pain.  Neurologic: No weakness, no hemiparesis, no paresthesias, no seizures, no syncope/near syncope.    T(C): 36.7 (11-29-21 @ 08:21), Max: 36.7 (11-29-21 @ 08:21)  HR: 80 (11-29-21 @ 08:21)  BP: 145/65 (11-29-21 @ 08:21)  RR: 20 (11-29-21 @ 08:21)  SpO2: 98% (11-29-21 @ 08:21)    Physical Examination:   General: Awake, alert, speech clear, no acute distress.  HEENT: PERRL, EOMI.  Neck: No elevated JVP, no bruit, trachea midline.  Chest: CTA B/L, S1, S2, no murmur, RRR.  Abdomen: Soft, nontender, nondistended, normal bowel sounds.  Extremities: No edema, 2+ pulses X 4 extremities.  Neurologic: A&OX3, CN 2-12 grossly intact. (26 Nov 2021 14:57)    General: No fatigue, no fevers/chills  Respiratory: No dyspnea, no cough, no wheeze  CV: No chest pain, no palpitations  Abd: No nausea  Neuro: No headache, no dizziness  No Known Allergies      Objective:  Vital Signs Last 24 Hrs  T(C): 36.9 (01 Dec 2021 07:58), Max: 36.9 (01 Dec 2021 07:58)  T(F): 98.4 (01 Dec 2021 07:58), Max: 98.4 (01 Dec 2021 07:58)  HR: 78 (01 Dec 2021 07:58) (66 - 85)  BP: 129/75 (01 Dec 2021 07:58) (113/75 - 144/81)  RR: 16 (01 Dec 2021 07:58) (14 - 19)  SpO2: 97% (01 Dec 2021 07:58) (97% - 100%)      CM: SR  Neuro: A&OX3, CN 2-12 intact  HEENT: NC, AT  Lungs: CTA B/L  CV: S1, S2, no murmur, RRR  Abd: Soft  Right Wrist: Soft, no bleeding, no hematoma  Extremity: + distal pulses  EKG:     NSR, no acute ischemic changes                DIAGNOSTICS:     Assessment of LVEF:       EF: 60-65%       Assessed by: Echo       Date: 10/15/2021    Noninvasive Testing:   Stress Test: 10/15/2021       Protocol: Brett       Duration of Exercise: 9:00       Symptoms: Fatigue       EKG Changes: 1 mm upsloping ST depressions in V4-5.       DTS: 9    Cardiac CT Calcium Score: 5/20/2021       LM: 216       LAD: 857       LCX: 173       RCA: 122       Total: 1368    Echo: 10/15/2021       LV: Normal, EF 60-65%       RV: Normal       LA: Normal       RA: Normal       Mitral Valve: Trace MR       Aortic Valve: Normal       Tricuspid Valve: Trivial TR       Pulmonic Valve: Normal       Pericardium: No evidence of pericardial effusion              ASSESSMENT AND PLAN:     67yo male with h/o HTN, HLD, GERD, and lymph node positive prostate cancer on apalutamide and Lupron, S/P radical prostatectomy, c/o a few of episodes of awakening with midsternal chest pressure, elevated CCS and underwent LHC 11/29/21 with MV CAD, s/p 4.0 X 38 Synergy JANETH to mRCA, now status post rotational atherectomy and PCI to p/mLAD with plan for PRCA and JANETH placement.        -VSS, no events overnight  -EKG without acute ischemic changes  -R groin site  dressing CDI no bleeding no hematoma noted, site soft non tender, positive pedal pulses bilat  -groin precautions reviewed with patient  -continue DAPT with Aspirin and Plavix (Rx sent to patient's pharmacy)  continue with high dose statin; atorvastatin 80 mg daily at HS  -continue with Lisinopril 2.5 mg daily  -will start Toprol-XL 25 mg daily   -patient to follow with Dr. Younger in January  -cleared by Dr. Del Toro to travel to [place] next week  -cardiac rehab info provided/referral and communication to cardiac rehab completed  -Discussed therapeutic lifestyle changes to reduce risk factors such as following a cardiac diet, weight loss, maintaining a healthy weight, exercise, smoking cessation, medication compliance, and regular follow-up  with MD to know your numbers (BP, cholesterol, weight, and glucose)

## 2021-12-02 ENCOUNTER — RX CHANGE (OUTPATIENT)
Age: 66
End: 2021-12-02

## 2021-12-08 PROBLEM — C61 MALIGNANT NEOPLASM OF PROSTATE: Chronic | Status: ACTIVE | Noted: 2021-11-26

## 2021-12-08 PROBLEM — I10 ESSENTIAL (PRIMARY) HYPERTENSION: Chronic | Status: ACTIVE | Noted: 2021-11-26

## 2021-12-08 PROBLEM — E78.00 PURE HYPERCHOLESTEROLEMIA, UNSPECIFIED: Chronic | Status: ACTIVE | Noted: 2021-11-29

## 2021-12-08 PROBLEM — K21.9 GASTRO-ESOPHAGEAL REFLUX DISEASE WITHOUT ESOPHAGITIS: Chronic | Status: ACTIVE | Noted: 2021-11-26

## 2021-12-28 ENCOUNTER — RX CHANGE (OUTPATIENT)
Age: 66
End: 2021-12-28

## 2022-01-03 ENCOUNTER — RX RENEWAL (OUTPATIENT)
Age: 67
End: 2022-01-03

## 2022-01-17 NOTE — HISTORY OF PRESENT ILLNESS
[FreeTextEntry1] : 66M h/o HLD, GERD, prostate cancer s/p radical prostatectomy (2/2021) with lymph node positive on androgen deprivation therapy (Apalutamide/Lupron) and salvage radiation [place]. Mayelin Newton [place] [facility] [place] of midsternal chest pain refer for cardio-oncology evaluation seen on 9/2021, exercise treadmill stress with 1 mm ST depression, refer for cardiac CTA  noted significant coronary artery calcifications with Ca-score of 1368 deferred CTA, incidental esophageal wall thickening started on PPI, underwent left heart cath found with 905 mRCA with JANETH x1 and staged PCI to heavily calcified 80% mLAD required rotational atherectomy with DESx2 on 11/30/21, presents for follow up. \par \par . \par Prior visit 9/2021: \par He reports 1 week ago at night awoke from sleep several times, pressure like pain, lasted for 10-15 minutes and occurred again 2nd time, feels different from his acid reflux, but had eaten some spicy food prior, then about 2 days later has similar chest discomfort at night but was milder. Denies exertional chest discomfort, can ambulate for 1 mile at time. \par \par \par No CAD or stroke in family\par Never smoker, social alcohol\par Working as , living in [place] \par \par Completed Pfizer vaccine

## 2022-01-17 NOTE — CARDIOLOGY SUMMARY
[de-identified] : 9/24/21- Sinus 73, normal axis, no ST-T changes, QTc 398 [de-identified] : 11/29-11/30/21- mRCA 90% s/p JANETH x1, mLAD 80% s/p Roto-JANETH x2

## 2022-01-17 NOTE — DISCUSSION/SUMMARY
[FreeTextEntry1] : 66M h/o HLD, GERD, prostate cancer s/p radical prostatectomy (2/2021) with lymph node positive on androgen deprivation therapy (Apalutamide/Lupron) and salvage radiation follows with Dr. Mayelin Newton and Erasmo Koehler [place] [facility] [place], recent onset of midsternal chest pain refer for cardiology evaluation. \par \par Overall atypical nonexertional chest pain occurred during sleep, EKG within normal, we have discussed options of further cardiac workup with exercise treadmill stress vs. cardiac CTA, he prefers nonradioactive method first with exercise treadmill stress which is appropriate with his atypical symptoms, will check baseline echocardiogram as well. We have also discussed literature reports of increased cardiovascular risk with androgen deprivation therapy, long term goal is for for optimal risk factors control of metabolic syndrome with controlling lipid and blood glucose. \par \par \par \par 1. Atypical chest pain- await TTE and EST, consider cardiac CTA if stress test abnormal. \par \par 2. HLD- on low dose atorvastatin, check fasting lipid on next blood drawn with A1c. \par \par 3. GERD- on omeprazole. \par \par 4. Prostate cancer- on ADT and salvage radiation. \par \par \par Follow up as needed if the above tests are normal. \par \par \par CC: Dr. Newton and Dr. Koehler ([facility] [place]

## 2022-01-18 ENCOUNTER — APPOINTMENT (OUTPATIENT)
Dept: CARDIOLOGY | Facility: CLINIC | Age: 67
End: 2022-01-18
Payer: COMMERCIAL

## 2022-02-03 ENCOUNTER — RX CHANGE (OUTPATIENT)
Age: 67
End: 2022-02-03

## 2022-02-08 ENCOUNTER — APPOINTMENT (OUTPATIENT)
Dept: CARDIOLOGY | Facility: CLINIC | Age: 67
End: 2022-02-08
Payer: COMMERCIAL

## 2022-02-08 ENCOUNTER — NON-APPOINTMENT (OUTPATIENT)
Age: 67
End: 2022-02-08

## 2022-02-08 VITALS
OXYGEN SATURATION: 96 % | BODY MASS INDEX: 28.19 KG/M2 | HEIGHT: 68 IN | HEART RATE: 79 BPM | DIASTOLIC BLOOD PRESSURE: 80 MMHG | WEIGHT: 186 LBS | SYSTOLIC BLOOD PRESSURE: 120 MMHG | TEMPERATURE: 98.7 F

## 2022-02-08 DIAGNOSIS — Z87.19 PERSONAL HISTORY OF OTHER DISEASES OF THE DIGESTIVE SYSTEM: ICD-10-CM

## 2022-02-08 DIAGNOSIS — R31.9 HEMATURIA, UNSPECIFIED: ICD-10-CM

## 2022-02-08 DIAGNOSIS — K21.9 GASTRO-ESOPHAGEAL REFLUX DISEASE W/OUT ESOPHAGITIS: ICD-10-CM

## 2022-02-08 PROCEDURE — 93000 ELECTROCARDIOGRAM COMPLETE: CPT

## 2022-02-08 PROCEDURE — 99214 OFFICE O/P EST MOD 30 MIN: CPT

## 2022-02-08 RX ORDER — APALUTAMIDE 60 MG/1
60 TABLET, FILM COATED ORAL DAILY
Refills: 0 | Status: ACTIVE | COMMUNITY
Start: 2022-02-08

## 2022-02-08 RX ORDER — TADALAFIL 5 MG/1
5 TABLET ORAL
Refills: 0 | Status: DISCONTINUED | COMMUNITY

## 2022-02-08 RX ORDER — LISINOPRIL 2.5 MG/1
2.5 TABLET ORAL
Qty: 90 | Refills: 3 | Status: DISCONTINUED | COMMUNITY
End: 2022-02-08

## 2022-02-08 RX ORDER — CLOPIDOGREL BISULFATE 75 MG/1
75 TABLET, FILM COATED ORAL
Qty: 90 | Refills: 3 | Status: ACTIVE | COMMUNITY
Start: 2022-01-17 | End: 1900-01-01

## 2022-02-08 RX ORDER — TADALAFIL 5 MG/1
5 TABLET ORAL
Refills: 0 | Status: ACTIVE | COMMUNITY
Start: 2022-02-08

## 2022-02-08 NOTE — CARDIOLOGY SUMMARY
[de-identified] : 9/24/21- Sinus 73, normal axis, no ST-T changes, QTc 398\par 2/8/22- Sinus 72, normal axis, no ST-T changes, QTc 401 [de-identified] : 11/29-11/30/21- mRCA 90% s/p JANETH x1, mLAD 80% s/p Roto-JANETH x2

## 2022-02-08 NOTE — DISCUSSION/SUMMARY
[FreeTextEntry1] : 66M h/o HLD, GERD, prostate cancer s/p radical prostatectomy (2/2021) with lymph node positive on androgen deprivation therapy (Apalutamide/Lupron) and salvage radiation (11/2021) [place]. Mayelin Newton [place] [facility] [place] of midsternal chest pain refer for cardio-oncology evaluation seen on 9/2021, exercise treadmill stress with 1 mm ST depression, refer for cardiac CTA  noted significant coronary artery calcifications with Ca-score of 1368 deferred CTA, incidental esophageal wall thickening started on PPI, underwent left heart cath found with 90% mRCA with JANETH x1 and staged PCI to heavily calcified 80% mLAD required rotational atherectomy with DESx2 on 11/30/21, interval followed up with Dr. Curry with complains of hematuria, presents for follow up. \par \par Overall stable from cardiac standpoint s/p recent PCI, no recurrence of chest pain, EKG within normal limit today. Recent perineal pain/hematuria/incontinence likely from radiation effect, await urology eval, we have discussed optimal need for DAPT for least 3-6 months but given new generation drug-eluting stent and in non-ACS setting can safely interrupt DAPT sooner if needed. \par \par We have previously discussed literature reports of increased cardiovascular risk with androgen deprivation therapy, long term goal is for for optimal risk factors control of metabolic syndrome with controlling lipid and blood glucose. \par \par \par \par 1. CAD s/p JANETH to LAD and RCA- continue ASA 81mg and clopidogrel 75mg, if hematuria persists and not able to be controlled may consider stopping clopidogrel by 3 months of use, otherwise would intent to continue for least 6 months. Continue atorvastatin 40mg and metoprolol succinate 25mg, normal BP and LV EF normal will discontinue lisinopril 2.5mg. \par \par 2. HLD- continue atorvastatin, check fasting lipid on next blood drawn with A1c. \par \par 3. GERD- continue pantoprazole, need eventual GI evaluation for EGD given CT chest with esophageal wall thickening and with chronic GERD. \par \par 4. Prostate cancer- on ADT and s/p salvage radiation, continue [place] [facility] [place] \par \par \par Follow up in 3 months. \par \par CC: [place] Koehler [place]

## 2022-02-08 NOTE — REVIEW OF SYSTEMS
[SOB] : shortness of breath [Urinary Frequency] : urinary frequency [Hematuria] : hematuria [Negative] : Heme/Lymph

## 2022-02-08 NOTE — HISTORY OF PRESENT ILLNESS
[FreeTextEntry1] : 66M h/o HLD, GERD, prostate cancer s/p radical prostatectomy (2/2021) with lymph node positive on androgen deprivation therapy (Apalutamide/Lupron) and salvage radiation (11/2021) [place]. Mayelin Newton [place] [facility] [place] of [place]ernal chest pain refer for cardio-oncology evaluation seen on 9/2021, exercise treadmill stress with 1 mm ST depression, refer for cardiac CTA  noted significant coronary artery calcifications with Ca-score of 1368 deferred CTA, incidental esophageal wall thickening started on PPI, underwent left heart cath found with 90% mRCA with JANETH x1 and staged PCI to heavily calcified 80% mLAD required rotational atherectomy with DESx2 on 11/30/21, interval followed up with Dr. Curry with complains of hematuria, presents for follow up. \par \par He traveled back to [place] for 1 month in December. \par Reports still having ongoing perineal pain and also gross hematuria at times with urinary incontinence, pending to see urology, reports been using NSAIDs with relief for the perineal pain but is aware on DAPT increasing GI bleeding risk, he reports no relief from steroids taper 3 weeks ago. Denies chest pain but have intermittent shortness of breath.\par \par Completed COVID booster. \par \par Prior visit 9/2021: \par He reports 1 week ago at night awoke from sleep several times, pressure like pain, lasted for 10-15 minutes and occurred again 2nd time, feels different from his acid reflux, but had eaten some spicy food prior, then about 2 days later has similar chest discomfort at night but was milder. Denies exertional chest discomfort, can ambulate for 1 mile at time. \par \par \par No CAD or stroke in family\par Never smoker, social alcohol\par Working as , living in [place] \par \par Completed Pfizer vaccine

## 2022-02-23 RX ORDER — ATORVASTATIN CALCIUM 80 MG/1
80 TABLET, FILM COATED ORAL
Qty: 90 | Refills: 3 | Status: ACTIVE | COMMUNITY
Start: 2021-11-07 | End: 1900-01-01

## 2022-03-15 ENCOUNTER — RX CHANGE (OUTPATIENT)
Age: 67
End: 2022-03-15

## 2022-03-21 ENCOUNTER — RX RENEWAL (OUTPATIENT)
Age: 67
End: 2022-03-21

## 2022-03-21 RX ORDER — PANTOPRAZOLE 40 MG/1
40 TABLET, DELAYED RELEASE ORAL
Qty: 90 | Refills: 1 | Status: ACTIVE | COMMUNITY
Start: 2021-11-07 | End: 1900-01-01

## 2022-05-27 ENCOUNTER — RX CHANGE (OUTPATIENT)
Age: 67
End: 2022-05-27

## 2022-05-31 ENCOUNTER — NON-APPOINTMENT (OUTPATIENT)
Age: 67
End: 2022-05-31

## 2022-05-31 ENCOUNTER — APPOINTMENT (OUTPATIENT)
Dept: CARDIOLOGY | Facility: CLINIC | Age: 67
End: 2022-05-31
Payer: COMMERCIAL

## 2022-05-31 VITALS
TEMPERATURE: 98.5 F | SYSTOLIC BLOOD PRESSURE: 130 MMHG | HEIGHT: 67.5 IN | WEIGHT: 192.4 LBS | OXYGEN SATURATION: 98 % | BODY MASS INDEX: 29.85 KG/M2 | DIASTOLIC BLOOD PRESSURE: 78 MMHG | HEART RATE: 65 BPM

## 2022-05-31 DIAGNOSIS — C61 MALIGNANT NEOPLASM OF PROSTATE: ICD-10-CM

## 2022-05-31 DIAGNOSIS — I10 ESSENTIAL (PRIMARY) HYPERTENSION: ICD-10-CM

## 2022-05-31 DIAGNOSIS — I25.10 ATHEROSCLEROTIC HEART DISEASE OF NATIVE CORONARY ARTERY W/OUT ANGINA PECTORIS: ICD-10-CM

## 2022-05-31 DIAGNOSIS — Z98.61 ATHEROSCLEROTIC HEART DISEASE OF NATIVE CORONARY ARTERY W/OUT ANGINA PECTORIS: ICD-10-CM

## 2022-05-31 DIAGNOSIS — R20.2 PARESTHESIA OF SKIN: ICD-10-CM

## 2022-05-31 PROCEDURE — 99214 OFFICE O/P EST MOD 30 MIN: CPT

## 2022-05-31 PROCEDURE — 93000 ELECTROCARDIOGRAM COMPLETE: CPT

## 2022-05-31 NOTE — DISCUSSION/SUMMARY
[FreeTextEntry1] : 67M h/o HLD, GERD, prostate cancer s/p radical prostatectomy (2/2021) with lymph node positive on androgen deprivation therapy (Apalutamide/Lupron) and salvage radiation (11/2021) follows with Dr. Mayelin Newton and Erasmo Koehler [place] [facility] [place] of midsternal chest pain refer for cardio-oncology evaluation seen on 9/2021, exercise treadmill stress with 1 mm ST depression, refer for cardiac CTA  noted significant coronary artery calcifications with Ca-score of 1368 deferred CTA, incidental esophageal wall thickening started on PPI, underwent left heart cath found with 90% mRCA with JANETH x1 and staged PCI to heavily calcified 80% mLAD required rotational atherectomy with DESx2 on 11/30/21, had episodes of hematuria, last seen 2/2022, presents for follow up. \par \par Overall stable from cardiac standpoint s/p 2 vessels PCI, no recurrence of chest pain, EKG within normal limit today. Prior perineal pain/hematuria/incontinence likely from radiation effect, normal urology eval, and hematuria since resolved, give 2 vessels PCI ideally would recommend least 1 year DAPT use but in non-ACS setting can safely interrupt DAPT sooner if needed. \par \par We have previously discussed literature reports of increased cardiovascular risk with androgen deprivation therapy, long term goal is for for optimal risk factors control of metabolic syndrome with controlling lipid and blood glucose. \par \par \par \par 1. CAD s/p JANETH to LAD and RCA- continue ASA 81mg and clopidogrel 75mg for least 1 year then can stop ASA 81mg and continue clopidogrel indefinitely. Continue atorvastatin 40mg and metoprolol succinate 25mg, normal BP and LV EF normal discontinued lisinopril 2.5mg. \par \par 2. HTN- controlled on low dose metoprolol. \par \par 3. HLD- continue atorvastatin, check fasting lipid on next blood drawn with A1c. \par \par 4. GERD- continue pantoprazole, need eventual GI evaluation for EGD given CT chest with esophageal wall thickening and with chronic GERD, can hold clopidogrel x5 days prior to the procedure if need biopsy but continue ASA 81mg, optimized from cardiac standpoint without cardiac contraindication. \par \par 5. Prostate cancer- on ADT and s/p salvage radiation, continue follow up with Dr. Koehler at [facility] [place]. \par \par 6. L-thigh paresthesia- if persists to seek neurology eval. \par \par \par Follow up in 6 months. \par \par CC: Dr. Koehler ([facility] [place]

## 2022-05-31 NOTE — HISTORY OF PRESENT ILLNESS
[FreeTextEntry1] : 67M h/o HLD, GERD, prostate cancer s/p radical prostatectomy (2/2021) with lymph node positive on androgen deprivation therapy (Apalutamide/Lupron) and salvage radiation (11/2021) follows with Dr. Mayelin Newton and Erasmo Koehler at [facility] [place] with reports of midsternal chest pain refer for cardio-oncology evaluation seen on 9/2021, exercise treadmill stress with 1 mm ST depression, refer for cardiac CTA  noted significant coronary artery calcifications with Ca-score of 1368 deferred CTA, incidental esophageal wall thickening started on PPI, underwent left heart cath found with 90% mRCA with JANETH x1 and staged PCI to heavily calcified 80% mLAD required rotational atherectomy with DESx2 on 11/30/21, had episodes of hematuria, last seen 2/2022, presents for follow up. \par \par Reports feeling well, recently returned from [facility] for vacation, no recurrence of chest pain or shortness of breath, hematuria and perineal discomfort has resolved, underwent cystoscopy this morning. Reports been having L-thigh paresthesia sensation for few months, denies leg pain or claudication, no back pain. Reports home -140s at home. \par \par Not yet receive 2nd COVID vaccine booster. \par \par \par Prior visit 2/2022: \par He traveled back to [place] for 1 month in December. \par Reports still having ongoing perineal pain and also gross hematuria at times with urinary incontinence, pending to see urology, reports been using NSAIDs with relief for the perineal pain but is aware on DAPT increasing GI bleeding risk, he reports no relief from steroids taper 3 weeks ago. Denies chest pain but have intermittent shortness of breath.\par \par Completed COVID booster. \par \par Prior visit 9/2021: \par He reports 1 week ago at night awoke from sleep several times, pressure like pain, lasted for 10-15 minutes and occurred again 2nd time, feels different from his acid reflux, but had eaten some spicy food prior, then about 2 days later has similar chest discomfort at night but was milder. Denies exertional chest discomfort, can ambulate for 1 mile at time. \par \par \par No CAD or stroke in family\par Never smoker, social alcohol\par Working as , living in [place] \par \par Completed Pfizer vaccine

## 2022-05-31 NOTE — CARDIOLOGY SUMMARY
[de-identified] : 9/24/21- Sinus 73, normal axis, no ST-T changes, QTc 398\par 2/8/22- Sinus 72, normal axis, no ST-T changes, QTc 401\par 5/31/22- Sinus 65, normal axis, no ST changes  [de-identified] : 11/29-11/30/21- mRCA 90% s/p JANETH x1, mLAD 80% s/p Roto-JANETH x2

## 2022-05-31 NOTE — PHYSICAL EXAM

## 2022-06-22 ENCOUNTER — RX CHANGE (OUTPATIENT)
Age: 67
End: 2022-06-22

## 2022-08-08 ENCOUNTER — TRANSCRIPTION ENCOUNTER (OUTPATIENT)
Age: 67
End: 2022-08-08

## 2022-08-25 ENCOUNTER — RX RENEWAL (OUTPATIENT)
Age: 67
End: 2022-08-25

## 2022-11-03 ENCOUNTER — RX RENEWAL (OUTPATIENT)
Age: 67
End: 2022-11-03

## 2022-12-12 ENCOUNTER — APPOINTMENT (OUTPATIENT)
Dept: CARDIOLOGY | Facility: CLINIC | Age: 67
End: 2022-12-12

## 2023-03-06 RX ORDER — METOPROLOL SUCCINATE 25 MG/1
25 TABLET, EXTENDED RELEASE ORAL
Qty: 30 | Refills: 0 | Status: ACTIVE | COMMUNITY
Start: 2022-08-25 | End: 1900-01-01

## 2023-03-17 ENCOUNTER — RX CHANGE (OUTPATIENT)
Age: 68
End: 2023-03-17

## 2025-05-20 ENCOUNTER — NON-APPOINTMENT (OUTPATIENT)
Age: 70
End: 2025-05-20

## 2025-05-21 ENCOUNTER — APPOINTMENT (OUTPATIENT)
Dept: CARDIOLOGY | Facility: CLINIC | Age: 70
End: 2025-05-21
Payer: MEDICARE

## 2025-05-21 ENCOUNTER — NON-APPOINTMENT (OUTPATIENT)
Age: 70
End: 2025-05-21

## 2025-05-21 VITALS
DIASTOLIC BLOOD PRESSURE: 80 MMHG | HEIGHT: 67.5 IN | HEART RATE: 63 BPM | OXYGEN SATURATION: 99 % | WEIGHT: 192 LBS | SYSTOLIC BLOOD PRESSURE: 136 MMHG | BODY MASS INDEX: 29.78 KG/M2

## 2025-05-21 DIAGNOSIS — I25.10 ATHEROSCLEROTIC HEART DISEASE OF NATIVE CORONARY ARTERY W/OUT ANGINA PECTORIS: ICD-10-CM

## 2025-05-21 DIAGNOSIS — C61 MALIGNANT NEOPLASM OF PROSTATE: ICD-10-CM

## 2025-05-21 DIAGNOSIS — I10 ESSENTIAL (PRIMARY) HYPERTENSION: ICD-10-CM

## 2025-05-21 DIAGNOSIS — K21.9 GASTRO-ESOPHAGEAL REFLUX DISEASE W/OUT ESOPHAGITIS: ICD-10-CM

## 2025-05-21 DIAGNOSIS — Z98.61 ATHEROSCLEROTIC HEART DISEASE OF NATIVE CORONARY ARTERY W/OUT ANGINA PECTORIS: ICD-10-CM

## 2025-05-21 PROCEDURE — 99204 OFFICE O/P NEW MOD 45 MIN: CPT

## 2025-05-21 PROCEDURE — 93000 ELECTROCARDIOGRAM COMPLETE: CPT

## 2025-05-21 PROCEDURE — G2211 COMPLEX E/M VISIT ADD ON: CPT

## 2025-05-21 RX ORDER — PSYLLIUM HUSK 0.4 G
CAPSULE ORAL DAILY
Refills: 0 | Status: ACTIVE | COMMUNITY

## 2025-05-21 RX ORDER — METHYLDOPA/HYDROCHLOROTHIAZIDE 250MG-15MG
TABLET ORAL DAILY
Refills: 0 | Status: ACTIVE | COMMUNITY

## 2025-05-21 RX ORDER — UBIDECARENONE 200 MG
CAPSULE ORAL
Refills: 0 | Status: ACTIVE | COMMUNITY

## 2025-05-21 RX ORDER — OMEPRAZOLE 40 MG/1
40 CAPSULE, DELAYED RELEASE ORAL
Qty: 90 | Refills: 3 | Status: ACTIVE | COMMUNITY
Start: 2025-05-21 | End: 1900-01-01

## 2025-05-21 RX ORDER — UBIDECARENONE/VIT E ACET 100MG-5
CAPSULE ORAL DAILY
Refills: 0 | Status: ACTIVE | COMMUNITY

## 2025-05-21 RX ORDER — CRANBERRY FRUIT EXTRACT 200 MG
CAPSULE ORAL
Refills: 0 | Status: ACTIVE | COMMUNITY

## 2025-07-11 ENCOUNTER — APPOINTMENT (OUTPATIENT)
Dept: CARDIOLOGY | Facility: CLINIC | Age: 70
End: 2025-07-11
Payer: MEDICARE

## 2025-07-11 PROCEDURE — 78451 HT MUSCLE IMAGE SPECT SING: CPT

## 2025-07-11 PROCEDURE — 93015 CV STRESS TEST SUPVJ I&R: CPT

## 2025-07-11 PROCEDURE — A9502: CPT

## 2025-07-11 PROCEDURE — 93306 TTE W/DOPPLER COMPLETE: CPT

## 2025-08-11 ENCOUNTER — TRANSCRIPTION ENCOUNTER (OUTPATIENT)
Age: 70
End: 2025-08-11